# Patient Record
Sex: FEMALE | Race: WHITE | Employment: FULL TIME | URBAN - METROPOLITAN AREA
[De-identification: names, ages, dates, MRNs, and addresses within clinical notes are randomized per-mention and may not be internally consistent; named-entity substitution may affect disease eponyms.]

---

## 2017-10-23 ENCOUNTER — HOSPITAL ENCOUNTER (EMERGENCY)
Facility: CLINIC | Age: 32
Discharge: HOME | End: 2017-10-23
Attending: EMERGENCY MEDICINE | Admitting: EMERGENCY MEDICINE

## 2017-10-23 VITALS
OXYGEN SATURATION: 96 % | HEIGHT: 63 IN | WEIGHT: 246 LBS | TEMPERATURE: 97.7 F | BODY MASS INDEX: 43.59 KG/M2 | RESPIRATION RATE: 15 BRPM | DIASTOLIC BLOOD PRESSURE: 84 MMHG | SYSTOLIC BLOOD PRESSURE: 133 MMHG | HEART RATE: 81 BPM

## 2017-10-23 DIAGNOSIS — H57.12 LEFT EYE PAIN: ICD-10-CM

## 2017-10-23 PROCEDURE — 99283 EMERGENCY DEPT VISIT LOW MDM: CPT

## 2017-10-23 RX ORDER — TETRACAINE HYDROCHLORIDE 5 MG/ML
SOLUTION OPHTHALMIC
Status: DISCONTINUED
Start: 2017-10-23 | End: 2017-10-23 | Stop reason: HOSPADM

## 2017-10-23 ASSESSMENT — VISUAL ACUITY
OD: 20/50
OS: 20/100

## 2017-10-23 ASSESSMENT — ENCOUNTER SYMPTOMS
PHOTOPHOBIA: 1
EYE PAIN: 1
EYE REDNESS: 1

## 2017-10-23 NOTE — DISCHARGE INSTRUCTIONS
You were evaluated for eye pain. We did not find a specific cause. You may have return of iritis but you must be seen by an eye clinic to confirm this.    Go directly to [facility] [place] office.  will be waiting to meet you.        Iritis    The iris is the colored part of the eye that controls the size of the pupil. Iritis is an inflammation of the iris. It can be caused by injury to the eye or disease elsewhere in the body. Diseases linked to iritis include Lyme disease, inflammatory bowel disease, and rheumatoid arthritis. Often, no cause is found.  Iritis usually develops suddenly in one eye. Symptoms include redness, pain in the eye or brow area, sensitivity to light, and blurred vision.  Iritis is treated with eye drops to dilate the pupil and reduce pain. This will cause your vision to be blurred from a few hours up to a week, depending on the medicine used. Steroid drops are typically prescribed to reduce pain from inflammation. Iritis from eye injury usually resolves in 1 to 2 weeks. Iritis from other causes may take several weeks to months to resolve.  Home care    Use eye drops as prescribed.    Wear sunglasses to decrease light sensitivity and discomfort.    If your eye is dilated or if you have been given an eye patch, your driving ability will be affected. Don't drive until the blurred vision wears off and you no longer need an eye patch.    You may use acetaminophen or ibuprofen to control pain, unless another medicine was prescribed. (Note: If you have chronic liver or kidney disease, or if you have ever had a stomach ulcer or gastrointestinal bleeding, talk with your doctor before using these medicines.)  Follow-up care  Follow up with your healthcare provider, or as advised.  When to seek medical advice  Contact your healthcare provider right away if any of these occur:    Symptoms don t start to improve after 1 week    Iritis from eye injury causes symptoms for more  than 2 weeks    Pain increases    Your eye becomes red    You lose some or all of your vision  Date Last Reviewed: 7/1/2017 2000-2017 [employer]. [address], [place] [address]. All rights reserved. This information is not intended as a substitute for professional medical care. Always follow your healthcare professional's instructions.

## 2017-10-23 NOTE — ED AVS SNAPSHOT
[facility] Emergency Department    [address]    [facility] [address]    Phone:  [phone]    Fax:  [phone]                                       Derrick Mix   MRN: [id number]    Department:  [facility] Emergency Department   Date of Visit:  10/23/2017           After Visit Summary Signature Page     I have received my discharge instructions, and my questions have been answered. I have discussed any challenges I see with this plan with the nurse or doctor.    ..........................................................................................................................................  Patient/Patient Representative Signature      ..........................................................................................................................................  Patient Representative Print Name and Relationship to Patient    ..................................................               ................................................  Date                                            Time    ..........................................................................................................................................  Reviewed by Signature/Title    ...................................................              ..............................................  Date                                                            Time

## 2017-10-23 NOTE — LETTER
To Whom it may concern:      Derrick Mix was seen in our Emergency Department today, 10/23/17.  I expect her condition to improve over the next 1-2 days.  She may return to work/school when improved.    Sincerely,        Timur Rhodes RN

## 2017-10-23 NOTE — ED AVS SNAPSHOT
[facility] Emergency Department    [address] [place] [address]    Phone:  [phone]    Fax:  [phone]                                       Derrick Mix   MRN: [id number]    Department:  [facility] Emergency Department   Date of Visit:  10/23/2017           Patient Information     Date Of Birth          1985        Your diagnoses for this visit were:     Left eye pain        You were seen by Leslie Samuels MD.        Discharge Instructions       You were evaluated for eye pain. We did not find a specific cause. You may have return of iritis but you must be seen by an eye clinic to confirm this.    Go directly to [facility] [place] office.  will be waiting to meet you.        Iritis    The iris is the colored part of the eye that controls the size of the pupil. Iritis is an inflammation of the iris. It can be caused by injury to the eye or disease elsewhere in the body. Diseases linked to iritis include Lyme disease, inflammatory bowel disease, and rheumatoid arthritis. Often, no cause is found.  Iritis usually develops suddenly in one eye. Symptoms include redness, pain in the eye or brow area, sensitivity to light, and blurred vision.  Iritis is treated with eye drops to dilate the pupil and reduce pain. This will cause your vision to be blurred from a few hours up to a week, depending on the medicine used. Steroid drops are typically prescribed to reduce pain from inflammation. Iritis from eye injury usually resolves in 1 to 2 weeks. Iritis from other causes may take several weeks to months to resolve.  Home care    Use eye drops as prescribed.    Wear sunglasses to decrease light sensitivity and discomfort.    If your eye is dilated or if you have been given an eye patch, your driving ability will be affected. Don't drive until the blurred vision wears off and you no longer need an eye patch.    You may use  acetaminophen or ibuprofen to control pain, unless another medicine was prescribed. (Note: If you have chronic liver or kidney disease, or if you have ever had a stomach ulcer or gastrointestinal bleeding, talk with your doctor before using these medicines.)  Follow-up care  Follow up with your healthcare provider, or as advised.  When to seek medical advice  Contact your healthcare provider right away if any of these occur:    Symptoms don t start to improve after 1 week    Iritis from eye injury causes symptoms for more than 2 weeks    Pain increases    Your eye becomes red    You lose some or all of your vision  Date Last Reviewed: 7/1/2017 2000-2017 [employer]. [address] [place] [address]. All rights reserved. This information is not intended as a substitute for professional medical care. Always follow your healthcare professional's instructions.          24 Hour Appointment Hotline       To make an appointment at any [facility], call [phone] ([phone]). If you don't have a family doctor or clinic, we will help you find one. [facility] are conveniently located to serve the needs of you and your family.             Review of your medicines      Notice     You have not been prescribed any medications.            Orders Needing Specimen Collection     None      Pending Results     No orders found from 10/21/2017 to 10/24/2017.            Pending Culture Results     No orders found from 10/21/2017 to 10/24/2017.            Pending Results Instructions     If you had any lab results that were not finalized at the time of your Discharge, you can call the ED Lab Result RN at [phone]. You will be contacted by this team for any positive Lab results or changes in treatment. The nurses are available 7 days a week from 10A to 6:30P.  You can leave a message 24 hours per day and they will return your call.        Test Results From Your Hospital Stay                Clinical Quality Measure: Blood Pressure Screening     Your blood pressure was checked while you were in the emergency department today. The last reading we obtained was  BP: 133/84 . Please read the guidelines below about what these numbers mean and what you should do about them.  If your systolic blood pressure (the top number) is less than 120 and your diastolic blood pressure (the bottom number) is less than 80, then your blood pressure is normal. There is nothing more that you need to do about it.  If your systolic blood pressure (the top number) is 120-139 or your diastolic blood pressure (the bottom number) is 80-89, your blood pressure may be higher than it should be. You should have your blood pressure rechecked within a year by a primary care provider.  If your systolic blood pressure (the top number) is 140 or greater or your diastolic blood pressure (the bottom number) is 90 or greater, you may have high blood pressure. High blood pressure is treatable, but if left untreated over time it can put you at risk for heart attack, stroke, or kidney failure. You should have your blood pressure rechecked by a primary care provider within the next 4 weeks.  If your provider in the emergency department today gave you specific instructions to follow-up with your doctor or provider even sooner than that, you should follow that instruction and not wait for up to 4 weeks for your follow-up visit.        Thank you for choosing [facility]       Thank you for choosing [facility] for your care. Our goal is always to provide you with excellent care. Hearing back from our patients is one way we can continue to improve our services. Please take a few minutes to complete the written survey that you may receive in the mail after you visit with us. Thank you!        [facility] Information     1-800-DOCTORS lets you send messages to your doctor, view your test results, renew your prescriptions, schedule appointments and more. To sign up,  "go to [email or web address] . Click on \"Log in\" on the left side of the screen, which will take you to the Welcome page. Then click on \"Sign up Now\" on the right side of the page.     You will be asked to enter the access code listed below, as well as some personal information. Please follow the directions to create your username and password.     Your access code is: [email or web address]  Expires: 2018  5:28 PM     Your access code will  in 90 days. If you need help or a new code, please call your [facility] clinic or [phone].        Care EveryWhere ID     This is your Care EveryWhere ID. This could be used by other organizations to access your [facility] medical records  [id number]        Equal Access to Services     DEVIN WHITLEY : Nathaniel Amaya, sue styles, gabrielle nagel, kimmy ferguson. So [facility] [phone].    ATENCIÓN: Si habla español, tiene a todd disposición servicios gratuitos de asistencia lingüística. Edna al [phone].    We comply with applicable federal civil rights laws and [place] laws. We do not discriminate on the basis of race, color, national origin, age, disability, sex, sexual orientation, or gender identity.            After Visit Summary       This is your record. Keep this with you and show to your community pharmacist(s) and doctor(s) at your next visit.                  "

## 2017-10-23 NOTE — LETTER
To Whom it may concern:      Derrick Mix was seen in our Emergency Department today, 10/23/17.  I expect her condition to improve over the next 1-2 days.  She may return to work/school when improved.    Sincerely,        Fide Olmedo RN
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
## 2017-10-23 NOTE — ED PROVIDER NOTES
"  History     Chief Complaint:  Eye pain    HPI   Derrick Mxi is a 32 year old female who presents to the ED for evaluation of left eye pain. The patient noticed a haze over her left eye yesterday. Yesterday evening, she had some photophobia in the left eye. This morning the patient began to experience pain in her left eye, which is what brought her to the ED today.  Her eye hurts when she tries to open it or tough it. The patient doesn't wear glasses or contacts, even though she is supposed to for far sightedness. In spite of the pain, the patient can still see and read out of her left eye. The patient had a previous eye injury, for which she sees Dr. Erickson at [facility]. The doctor has recommended surgery, which the patient has so far refused. The patient has normal vision in her right eye.     Allergies:  Latex  Paxil [Paroxetine Mesylate]  Zoloft       Medications:    The patient is currently on no regular medications.       Past Medical History:    Bipolar  Asthma      Past Surgical History:    Hernia    Family History:    No past pertinent family history.    Social History:  Smoker: Yes  Negative for alcohol use.  Presented alone.  Marital Status:  Single [1]    Review of Systems   Eyes: Positive for photophobia, pain, redness and visual disturbance.   All other systems reviewed and are negative.        Physical Exam   First Vitals:  BP: 130/75  Pulse: 81  Heart Rate: 81  Temp: 97.7  F (36.5  C)  Resp: 16  Height: 160 cm (5' 3\")  Weight: 111.6 kg (246 lb)  SpO2: 97 %  Visual Acuity-Left: 20/100 (Pt wore sunglasses during exam.)  Visual Acuity-Right: 20/50 (Pt wore sunglasses during exam.)      Physical Exam  Constitutional:  Well developed, Well nourished, comfortable appearing   HENT: Normocephalic,Mucous membranes moist  Respiratory:  No tachypnea or respiratory distress  Musculoskeletal:  No gross deformities, grossly unremarkable range of motion   Integument:  Warm, Dry   Neurologic: " " Alert, attentive and appropriately oriented  Psychiatric:  Very anxious    Procedure Note:   Slit Lamp Examination  Performed by:  Dr. Leslie Samuels  Indication:  Eye pain/discomfort    Tetracaine drops were used to anesthetize the affected eye.  Fluorescein staining was performed.  The eye was inspected under white and blue light, at low and high magnification    Findings:  Eyelids:  Normal externally, the patient could not tolerate eyelid eversion  Cornea:  Normal  Iris:   Normal  Conjunctiva:  Injected  Anterior Chamber: Normal  Pupils:   Normal    Impression:     Exam is notable for photophobia and conjunctival injection, but no corneal abnormality is noted.  Pressure was normal at 15-17          Emergency Department Course         Interventions:  Medications   fluorescein ophthalmic strip 1 strip (not administered)   tetracaine (PONTOCAINE) 0.5 % ophthalmic solution (not administered)     The patient's symptoms were improved with tetracaine    Emergency Department Course:  1718: I spoke with ophthalmology Dr. Beltran from [facility].  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  She had some \"vitreal heme\" that was persistent and therefore was referred to a retinal specialist but never followed up.  I discussed the case at length including visual acuity, exam including fluorescein pressure testing.  Dr. Gaffney that is able to see this patient directly and the patient prefers and is able to go directly to clinic from here.      Impression & Plan      Medical Decision Making:  Derrick Mix is a 32 year old female who presents with eye discomfort. The exam is significant for significant photophobia and conjunctival injection, but no abnormality on fluorescein exam. This is consistent with corneal abrasion. No foreign bodies in eyes or limited lid exam.  No corneal ulcers. No signs of retinal abnormalities, open globe, " acute glaucoma, or other serious eye disease.  No definite signs of anterior chamber involvement such as endopthalmitis at this point.  Given her significant discomfort and history, I did discuss case with ophthalmology, and they are able to see her immediately.  She'll go there directly.     Diagnosis:    ICD-10-CM    1. Left eye pain H57.12        Disposition:  discharged to ophthalmology    Discharge Medications:  There are no discharge medications for this patient.  I, Jordan Meléndez, am serving as a scribe on 10/23/2017 at 4:45 PM to personally document services performed by Leslie Samuels MD based on my observations and the provider's statements to me.              Leslie Samuels MD  10/23/17 1918

## 2018-03-14 ENCOUNTER — HOSPITAL ENCOUNTER (EMERGENCY)
Facility: CLINIC | Age: 33
Discharge: HOME | End: 2018-03-14
Attending: EMERGENCY MEDICINE | Admitting: EMERGENCY MEDICINE
Payer: COMMERCIAL

## 2018-03-14 ENCOUNTER — APPOINTMENT (OUTPATIENT)
Dept: GENERAL RADIOLOGY | Facility: CLINIC | Age: 33
End: 2018-03-14
Attending: PHYSICIAN ASSISTANT
Payer: COMMERCIAL

## 2018-03-14 VITALS
BODY MASS INDEX: 42.18 KG/M2 | OXYGEN SATURATION: 98 % | SYSTOLIC BLOOD PRESSURE: 117 MMHG | DIASTOLIC BLOOD PRESSURE: 76 MMHG | WEIGHT: 238.1 LBS | TEMPERATURE: 97.4 F | RESPIRATION RATE: 18 BRPM

## 2018-03-14 DIAGNOSIS — S30.0XXA CONTUSION OF LOWER BACK, INITIAL ENCOUNTER: ICD-10-CM

## 2018-03-14 DIAGNOSIS — M25.511 ACUTE PAIN OF RIGHT SHOULDER: ICD-10-CM

## 2018-03-14 PROCEDURE — 73030 X-RAY EXAM OF SHOULDER: CPT | Mod: RT

## 2018-03-14 PROCEDURE — 99284 EMERGENCY DEPT VISIT MOD MDM: CPT | Mod: 25

## 2018-03-14 PROCEDURE — 25000132 ZZH RX MED GY IP 250 OP 250 PS 637: Performed by: PHYSICIAN ASSISTANT

## 2018-03-14 PROCEDURE — 72100 X-RAY EXAM L-S SPINE 2/3 VWS: CPT

## 2018-03-14 RX ORDER — NAPROXEN 500 MG/1
500 TABLET ORAL 2 TIMES DAILY WITH MEALS
Qty: 16 TABLET | Refills: 0 | Status: SHIPPED | OUTPATIENT
Start: 2018-03-14 | End: 2018-03-22

## 2018-03-14 RX ORDER — HYDROCODONE BITARTRATE AND ACETAMINOPHEN 5; 325 MG/1; MG/1
2 TABLET ORAL ONCE
Status: COMPLETED | OUTPATIENT
Start: 2018-03-14 | End: 2018-03-14

## 2018-03-14 RX ORDER — CYCLOBENZAPRINE HCL 10 MG
10 TABLET ORAL 3 TIMES DAILY PRN
Qty: 20 TABLET | Refills: 0 | Status: SHIPPED | OUTPATIENT
Start: 2018-03-14 | End: 2018-03-17

## 2018-03-14 RX ADMIN — HYDROCODONE BITARTRATE AND ACETAMINOPHEN 2 TABLET: 5; 325 TABLET ORAL at 07:41

## 2018-03-14 ASSESSMENT — ENCOUNTER SYMPTOMS
SINUS PAIN: 0
FEVER: 0
FATIGUE: 0
EYE PAIN: 0
FLANK PAIN: 0
ABDOMINAL PAIN: 0
NECK STIFFNESS: 0
APPETITE CHANGE: 0
ARTHRALGIAS: 1
DYSURIA: 0
CONSTIPATION: 0
WEAKNESS: 0
JOINT SWELLING: 0
CHILLS: 0
MYALGIAS: 1
DIARRHEA: 0
SHORTNESS OF BREATH: 0
CHEST TIGHTNESS: 0
NECK PAIN: 0
DIZZINESS: 0
NAUSEA: 0
HEMATURIA: 0
COUGH: 0
NUMBNESS: 0
HEADACHES: 0
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
## 2018-03-14 NOTE — ED AVS SNAPSHOT
[facility] Emergency Department    [address]    [facility] [address]    Phone:  [phone]    Fax:  [phone]                                       Derrick Mix   MRN: [id number]    Department:  [facility] Emergency Department   Date of Visit:  3/14/2018           After Visit Summary Signature Page     I have received my discharge instructions, and my questions have been answered. I have discussed any challenges I see with this plan with the nurse or doctor.    ..........................................................................................................................................  Patient/Patient Representative Signature      ..........................................................................................................................................  Patient Representative Print Name and Relationship to Patient    ..................................................               ................................................  Date                                            Time    ..........................................................................................................................................  Reviewed by Signature/Title    ...................................................              ..............................................  Date                                                            Time
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
## 2018-03-14 NOTE — DISCHARGE INSTRUCTIONS
"  SCIATICA    Sciatica (\"Lumbar Radiculopathy\") causes a pain that spreads from the lower back down into the buttock, hip and leg. Sometimes leg pain can occur without any back pain. Sciatica is due to irritation or pressure on a spinal nerve as it comes out of the spinal canal. This is most often due to a bulge or rupture of a nearby spinal disk (the cartilage cushion between each spinal bone), which presses on a nearby nerve. Other causes include spinal stenosis (narrowing of the spinal canal) and spasm of the piriformis muscle (a muscle in the buttocks that the sciatic nerve passes through).  Sciatica may begin after a sudden twisting/bending force (such as in a car accident), or sometimes after a simple awkward movement. In either case, muscle spasm is commonly present and contributes to the pain.  The diagnosis of sciatica is made from the symptoms and physical exam. Unless you had a physical injury (such as a car accident or fall), X-rays are usually not ordered for the initial evaluation of sciatica because the nerves and disks cannot be seen on an X-ray. Most sciatica (80-90%) gets better with time.  What can I do about my low back pain?   There are three main things you can do to ease low back pain and help it go away.    Use heat or cold packs.     Take medicine as directed.     Use positions, movements and exercises. Stay active! Too much rest can make your symptoms worse.   Using heat or cold packs  Try cold packs or gentle heat to ease your pain. Use whichever gives the most relief. Apply the cold pack or heat for 15 minutes at a time, as often as needed.  Taking medicine  If taking over-the-counter medicine:    Take ibuprofen (Advil, Motrin) 600 mg. three times a day as needed for pain.   OR    Take Aleve (naproxen sodium) 220 to 440 mg. two times a day as needed for pain   If your doctor prescribed a muscle relaxant (cyclobenzapine 10 mg.):    Take one half ( ) to 1 tablet at bedtime     Do not drive " when taking this medicine. This drug may make you sleepy.   Using positions, movements and exercises  Research tells us that moving your joints and muscles can help you recover from back pain. Such activity should be simple and gentle.  Use the positions below as well as walking to help relieve your discomfort. Try taking a short walk every 3 to 4 hours during the day. Walk for a few minutes inside your home or take longer walks outside, on a treadmill or at a mall. Slowly increase the amount of time you walk. Expect discomfort when you begin, but it should lessen as your back starts to recover.  Finding a position that is comfortable  When your back pain is new, you may find that certain positions will ease your pain. Gently try each of the following positions until you find one that eases your pain. Once you find a position of comfort, use it as often as you like while you recover. Return to your daily routine as soon as possible.     Lie on your back with your legs bent. You can do this by placing a pillow under your knees or lie on the floor and rest your lower legs on the seat of a chair.     Lie on your side with your knees bent and place a pillow between your knees.     Lie on your stomach over pillows.   When should I call my doctor?  Your back pain should improve over the first couple of weeks. As it improves, you should be able to return to your normal activities. But call your doctor if:    You have a sudden change in your ability to control?     your bladder or bowels.     You begin to feel tingling in your groin or legs.     The pain spreads down your leg and into your foot.     Your toes, feet or leg muscles begin to feel weak.     You feel generally unwell or sick.     Your pain gets worse.     9696-1801 [facility], [address] [place] [address], [place] [address]. All rights reserved. This information is not intended as a substitute for professional medical care. Always follow your healthcare  professional's instructions.  Shoulder Pain   Shoulder pain can have many causes. Pain often comes from the structures that surround the shoulder joint. These are the joint capsule, ligaments, tendons, muscles, and bursa. Pain can also come from cartilage in the joint. Cartilage can become worn out or injured. It s important to know what s causing your pain so the healthcare provider can use the correct treatment. But sometimes it s difficult to find the exact cause of shoulder pain. You may need to see a specialist (orthopedist). You may also need special tests such as a CT scan or MRI. The provider may need to use special tools to look inside the joint (arthroscopy).  Shoulder pain can be treated with a sling or a device that keeps your shoulder from moving. You can take an anti-inflammatory medicine such as ibuprofen to ease pain. You may need to do special shoulder exercises. Follow up with a specialist if the pain is severe or doesn t go away after a few weeks.  Home care  Follow these tips when caring for yourself at home:    If a sling was given to you, leave it in place for the time advised by your healthcare provider. If you aren t sure how long to wear it, ask for advice. If the sling becomes loose, adjust it so that your forearm is level with the ground. Your shoulder should feel well supported.    Put an ice pack on the injured area for 20 minutes every 1 to 2 hours the first day. You can make your own ice pack by putting ice cubes in a plastic bag. Wrap the bag in a thin towel. Continue with ice packs 3 to 4 times a day for the next 2 days. Then use the pack as needed to ease pain and swelling.    You may use acetaminophen or ibuprofen to control pain, unless another pain medicine was prescribed. If you have chronic liver or kidney disease, talk with your healthcare provider before using these medicines. Also talk with your provider if you ve ever had a stomach ulcer or GI bleeding.    Shoulder pain may  seem worse at night, when there is less to distract you from the pain. If you sleep on your side, try to keep weight off your painful shoulder. Propping pillows behind you may stop you from rolling over onto that shoulder during sleep.     Shoulder and elbow joints can become stiff if left in a sling for too long. You should start range of motion exercises about 7 to 10 days after the injury. Talk with your provider to find out what type of exercises to do and how soon to start.    You can take the sling off to shower or bathe.  Follow-up care  Follow up with your healthcare provider if you don t start to get better in the next 5 days.  When to seek medical advice  Call your healthcare provider right away if any of these occur:    Pain or swelling gets worse or continues for more than a few days    Your hand or fingers become cold, blue, numb, or tingly    Large amount of bruising on your shoulder or upper arm    Difficulty moving your hand or fingers    Weakness in your hand or fingers    Your shoulder becomes stiff    It feels like your shoulder is popping out    You are less able to do your daily activities  Date Last Reviewed: 10/1/2016    [id number] [employer] [address] [place] [address], [place] [address]. All rights reserved. This information is not intended as a substitute for professional medical care. Always follow your healthcare professional's instructions.
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
## 2020-01-11 ENCOUNTER — HOSPITAL ENCOUNTER (INPATIENT)
Facility: CLINIC | Age: 35
LOS: 4 days | Discharge: HOME | End: 2020-01-16
Attending: PHYSICIAN ASSISTANT | Admitting: INTERNAL MEDICINE
Payer: COMMERCIAL

## 2020-01-11 DIAGNOSIS — K65.1 INTRA-ABDOMINAL ABSCESS (H): ICD-10-CM

## 2020-01-11 PROCEDURE — 99285 EMERGENCY DEPT VISIT HI MDM: CPT | Mod: 25

## 2020-01-11 PROCEDURE — 84702 CHORIONIC GONADOTROPIN TEST: CPT

## 2020-01-11 PROCEDURE — 85025 COMPLETE CBC W/AUTO DIFF WBC: CPT | Performed by: PHYSICIAN ASSISTANT

## 2020-01-11 PROCEDURE — 80048 BASIC METABOLIC PNL TOTAL CA: CPT | Performed by: PHYSICIAN ASSISTANT

## 2020-01-11 RX ORDER — HYDROMORPHONE HYDROCHLORIDE 1 MG/ML
0.5 INJECTION, SOLUTION INTRAMUSCULAR; INTRAVENOUS; SUBCUTANEOUS
Status: DISCONTINUED | OUTPATIENT
Start: 2020-01-11 | End: 2020-01-12

## 2020-01-11 RX ORDER — ONDANSETRON 2 MG/ML
4 INJECTION INTRAMUSCULAR; INTRAVENOUS ONCE
Status: COMPLETED | OUTPATIENT
Start: 2020-01-11 | End: 2020-01-12

## 2020-01-11 NOTE — LETTER
[place] [address] [facility]  [address] [facility] [address]  [phone]    2020    Re: Derrick Mix  [address]  [facility] [address]  [phone] (home) none (work)    : 1985      To Whom It May Concern:      Derrick Mix was hospitalized from 2020 until 2020 due to medical illness.  She may return to work 2020 with full duty.        Sincerely,        Ekaterina Rodriguez MD

## 2020-01-12 ENCOUNTER — APPOINTMENT (OUTPATIENT)
Dept: ULTRASOUND IMAGING | Facility: CLINIC | Age: 35
End: 2020-01-12
Attending: PHYSICIAN ASSISTANT
Payer: COMMERCIAL

## 2020-01-12 ENCOUNTER — APPOINTMENT (OUTPATIENT)
Dept: CT IMAGING | Facility: CLINIC | Age: 35
End: 2020-01-12
Attending: PHYSICIAN ASSISTANT
Payer: COMMERCIAL

## 2020-01-12 PROBLEM — K57.32 DIVERTICULITIS LARGE INTESTINE: Status: ACTIVE | Noted: 2020-01-12

## 2020-01-12 LAB
ALBUMIN UR-MCNC: NEGATIVE MG/DL
ANION GAP SERPL CALCULATED.3IONS-SCNC: 2 MMOL/L (ref 3–14)
ANION GAP SERPL CALCULATED.3IONS-SCNC: 5 MMOL/L (ref 3–14)
APPEARANCE UR: CLEAR
B-HCG FREE SERPL-ACNC: <5 IU/L
BASOPHILS # BLD AUTO: 0 10E9/L (ref 0–0.2)
BASOPHILS NFR BLD AUTO: 0.3 %
BILIRUB UR QL STRIP: NEGATIVE
BUN SERPL-MCNC: 7 MG/DL (ref 7–30)
BUN SERPL-MCNC: 8 MG/DL (ref 7–30)
C TRACH DNA SPEC QL NAA+PROBE: NEGATIVE
CALCIUM SERPL-MCNC: 8.2 MG/DL (ref 8.5–10.1)
CALCIUM SERPL-MCNC: 8.8 MG/DL (ref 8.5–10.1)
CHLORIDE SERPL-SCNC: 105 MMOL/L (ref 94–109)
CHLORIDE SERPL-SCNC: 110 MMOL/L (ref 94–109)
CO2 SERPL-SCNC: 26 MMOL/L (ref 20–32)
CO2 SERPL-SCNC: 27 MMOL/L (ref 20–32)
COLOR UR AUTO: ABNORMAL
CREAT SERPL-MCNC: 0.77 MG/DL (ref 0.52–1.04)
CREAT SERPL-MCNC: 0.81 MG/DL (ref 0.52–1.04)
DIFFERENTIAL METHOD BLD: ABNORMAL
EOSINOPHIL # BLD AUTO: 0.2 10E9/L (ref 0–0.7)
EOSINOPHIL NFR BLD AUTO: 1 %
ERYTHROCYTE [DISTWIDTH] IN BLOOD BY AUTOMATED COUNT: 12.7 % (ref 10–15)
ERYTHROCYTE [DISTWIDTH] IN BLOOD BY AUTOMATED COUNT: 12.8 % (ref 10–15)
GFR SERPL CREATININE-BSD FRML MDRD: >90 ML/MIN/{1.73_M2}
GFR SERPL CREATININE-BSD FRML MDRD: >90 ML/MIN/{1.73_M2}
GLUCOSE SERPL-MCNC: 101 MG/DL (ref 70–99)
GLUCOSE SERPL-MCNC: 111 MG/DL (ref 70–99)
GLUCOSE UR STRIP-MCNC: NEGATIVE MG/DL
HCT VFR BLD AUTO: 38.2 % (ref 35–47)
HCT VFR BLD AUTO: 42.1 % (ref 35–47)
HGB BLD-MCNC: 12.4 G/DL (ref 11.7–15.7)
HGB BLD-MCNC: 13.6 G/DL (ref 11.7–15.7)
HGB UR QL STRIP: NEGATIVE
IMM GRANULOCYTES # BLD: 0.1 10E9/L (ref 0–0.4)
IMM GRANULOCYTES NFR BLD: 0.5 %
KETONES UR STRIP-MCNC: NEGATIVE MG/DL
LACTATE BLD-SCNC: 0.5 MMOL/L (ref 0.7–2)
LEUKOCYTE ESTERASE UR QL STRIP: ABNORMAL
LYMPHOCYTES # BLD AUTO: 2.8 10E9/L (ref 0.8–5.3)
LYMPHOCYTES NFR BLD AUTO: 19 %
MCH RBC QN AUTO: 29.8 PG (ref 26.5–33)
MCH RBC QN AUTO: 29.9 PG (ref 26.5–33)
MCHC RBC AUTO-ENTMCNC: 32.3 G/DL (ref 31.5–36.5)
MCHC RBC AUTO-ENTMCNC: 32.5 G/DL (ref 31.5–36.5)
MCV RBC AUTO: 92 FL (ref 78–100)
MCV RBC AUTO: 92 FL (ref 78–100)
MONOCYTES # BLD AUTO: 0.7 10E9/L (ref 0–1.3)
MONOCYTES NFR BLD AUTO: 5 %
N GONORRHOEA DNA SPEC QL NAA+PROBE: NEGATIVE
NEUTROPHILS # BLD AUTO: 10.9 10E9/L (ref 1.6–8.3)
NEUTROPHILS NFR BLD AUTO: 74.2 %
NITRATE UR QL: NEGATIVE
NRBC # BLD AUTO: 0 10*3/UL
NRBC BLD AUTO-RTO: 0 /100
PH UR STRIP: 7 PH (ref 5–7)
PLATELET # BLD AUTO: 220 10E9/L (ref 150–450)
PLATELET # BLD AUTO: 234 10E9/L (ref 150–450)
POTASSIUM SERPL-SCNC: 3.7 MMOL/L (ref 3.4–5.3)
POTASSIUM SERPL-SCNC: 3.8 MMOL/L (ref 3.4–5.3)
RBC # BLD AUTO: 4.15 10E12/L (ref 3.8–5.2)
RBC # BLD AUTO: 4.56 10E12/L (ref 3.8–5.2)
RBC #/AREA URNS AUTO: 4 /HPF (ref 0–2)
SODIUM SERPL-SCNC: 136 MMOL/L (ref 133–144)
SODIUM SERPL-SCNC: 139 MMOL/L (ref 133–144)
SOURCE: ABNORMAL
SP GR UR STRIP: 1 (ref 1–1.03)
SPECIMEN SOURCE: ABNORMAL
SPECIMEN SOURCE: NORMAL
SPECIMEN SOURCE: NORMAL
SQUAMOUS #/AREA URNS AUTO: 1 /HPF (ref 0–1)
UROBILINOGEN UR STRIP-MCNC: NORMAL MG/DL (ref 0–2)
WBC # BLD AUTO: 13.2 10E9/L (ref 4–11)
WBC # BLD AUTO: 14.7 10E9/L (ref 4–11)
WBC #/AREA URNS AUTO: 14 /HPF (ref 0–5)
WET PREP SPEC: ABNORMAL

## 2020-01-12 PROCEDURE — 36415 COLL VENOUS BLD VENIPUNCTURE: CPT | Performed by: INTERNAL MEDICINE

## 2020-01-12 PROCEDURE — 25800030 ZZH RX IP 258 OP 636: Performed by: INTERNAL MEDICINE

## 2020-01-12 PROCEDURE — 25000128 H RX IP 250 OP 636

## 2020-01-12 PROCEDURE — 25000132 ZZH RX MED GY IP 250 OP 250 PS 637: Performed by: INTERNAL MEDICINE

## 2020-01-12 PROCEDURE — 96365 THER/PROPH/DIAG IV INF INIT: CPT | Mod: 59

## 2020-01-12 PROCEDURE — 80048 BASIC METABOLIC PNL TOTAL CA: CPT | Performed by: INTERNAL MEDICINE

## 2020-01-12 PROCEDURE — 83605 ASSAY OF LACTIC ACID: CPT | Performed by: INTERNAL MEDICINE

## 2020-01-12 PROCEDURE — 25000132 ZZH RX MED GY IP 250 OP 250 PS 637: Performed by: OBSTETRICS & GYNECOLOGY

## 2020-01-12 PROCEDURE — 25000128 H RX IP 250 OP 636: Performed by: INTERNAL MEDICINE

## 2020-01-12 PROCEDURE — 25000128 H RX IP 250 OP 636: Performed by: PHYSICIAN ASSISTANT

## 2020-01-12 PROCEDURE — 12000000 ZZH R&B MED SURG/OB

## 2020-01-12 PROCEDURE — 96375 TX/PRO/DX INJ NEW DRUG ADDON: CPT

## 2020-01-12 PROCEDURE — 85027 COMPLETE CBC AUTOMATED: CPT | Performed by: INTERNAL MEDICINE

## 2020-01-12 PROCEDURE — 25800030 ZZH RX IP 258 OP 636: Performed by: PHYSICIAN ASSISTANT

## 2020-01-12 PROCEDURE — 87210 SMEAR WET MOUNT SALINE/INK: CPT | Performed by: PHYSICIAN ASSISTANT

## 2020-01-12 PROCEDURE — 25000125 ZZHC RX 250: Performed by: PHYSICIAN ASSISTANT

## 2020-01-12 PROCEDURE — 74177 CT ABD & PELVIS W/CONTRAST: CPT

## 2020-01-12 PROCEDURE — 25000128 H RX IP 250 OP 636: Performed by: HOSPITALIST

## 2020-01-12 PROCEDURE — 81001 URINALYSIS AUTO W/SCOPE: CPT | Performed by: PHYSICIAN ASSISTANT

## 2020-01-12 PROCEDURE — 99223 1ST HOSP IP/OBS HIGH 75: CPT | Mod: AI | Performed by: INTERNAL MEDICINE

## 2020-01-12 PROCEDURE — 96376 TX/PRO/DX INJ SAME DRUG ADON: CPT

## 2020-01-12 PROCEDURE — 76856 US EXAM PELVIC COMPLETE: CPT

## 2020-01-12 PROCEDURE — 87491 CHLMYD TRACH DNA AMP PROBE: CPT | Performed by: PHYSICIAN ASSISTANT

## 2020-01-12 PROCEDURE — 96361 HYDRATE IV INFUSION ADD-ON: CPT

## 2020-01-12 PROCEDURE — 87591 N.GONORRHOEAE DNA AMP PROB: CPT | Performed by: PHYSICIAN ASSISTANT

## 2020-01-12 RX ORDER — POTASSIUM CL/LIDO/0.9 % NACL 10MEQ/0.1L
10 INTRAVENOUS SOLUTION, PIGGYBACK (ML) INTRAVENOUS
Status: DISCONTINUED | OUTPATIENT
Start: 2020-01-12 | End: 2020-01-16 | Stop reason: HOSPADM

## 2020-01-12 RX ORDER — POTASSIUM CHLORIDE 29.8 MG/ML
20 INJECTION INTRAVENOUS
Status: DISCONTINUED | OUTPATIENT
Start: 2020-01-12 | End: 2020-01-16 | Stop reason: HOSPADM

## 2020-01-12 RX ORDER — POTASSIUM CHLORIDE 1500 MG/1
20-40 TABLET, EXTENDED RELEASE ORAL
Status: DISCONTINUED | OUTPATIENT
Start: 2020-01-12 | End: 2020-01-16 | Stop reason: HOSPADM

## 2020-01-12 RX ORDER — SODIUM CHLORIDE 9 MG/ML
INJECTION, SOLUTION INTRAVENOUS CONTINUOUS
Status: DISCONTINUED | OUTPATIENT
Start: 2020-01-12 | End: 2020-01-16 | Stop reason: HOSPADM

## 2020-01-12 RX ORDER — POTASSIUM CHLORIDE 1.5 G/1.58G
20-40 POWDER, FOR SOLUTION ORAL
Status: DISCONTINUED | OUTPATIENT
Start: 2020-01-12 | End: 2020-01-16 | Stop reason: HOSPADM

## 2020-01-12 RX ORDER — MAGNESIUM SULFATE HEPTAHYDRATE 40 MG/ML
4 INJECTION, SOLUTION INTRAVENOUS EVERY 4 HOURS PRN
Status: DISCONTINUED | OUTPATIENT
Start: 2020-01-12 | End: 2020-01-16 | Stop reason: HOSPADM

## 2020-01-12 RX ORDER — ONDANSETRON 4 MG/1
4 TABLET, ORALLY DISINTEGRATING ORAL EVERY 6 HOURS PRN
Status: DISCONTINUED | OUTPATIENT
Start: 2020-01-12 | End: 2020-01-16 | Stop reason: HOSPADM

## 2020-01-12 RX ORDER — IOPAMIDOL 755 MG/ML
500 INJECTION, SOLUTION INTRAVASCULAR ONCE
Status: COMPLETED | OUTPATIENT
Start: 2020-01-12 | End: 2020-01-12

## 2020-01-12 RX ORDER — POTASSIUM CHLORIDE 7.45 MG/ML
10 INJECTION INTRAVENOUS
Status: DISCONTINUED | OUTPATIENT
Start: 2020-01-12 | End: 2020-01-16 | Stop reason: HOSPADM

## 2020-01-12 RX ORDER — MORPHINE SULFATE 2 MG/ML
INJECTION, SOLUTION INTRAMUSCULAR; INTRAVENOUS
Status: COMPLETED
Start: 2020-01-12 | End: 2020-01-12

## 2020-01-12 RX ORDER — ONDANSETRON 2 MG/ML
4 INJECTION INTRAMUSCULAR; INTRAVENOUS EVERY 6 HOURS PRN
Status: DISCONTINUED | OUTPATIENT
Start: 2020-01-12 | End: 2020-01-16 | Stop reason: HOSPADM

## 2020-01-12 RX ORDER — DOXYCYCLINE 100 MG/1
100 CAPSULE ORAL EVERY 12 HOURS SCHEDULED
Status: DISCONTINUED | OUTPATIENT
Start: 2020-01-12 | End: 2020-01-16 | Stop reason: HOSPADM

## 2020-01-12 RX ORDER — MORPHINE SULFATE 2 MG/ML
2-4 INJECTION, SOLUTION INTRAMUSCULAR; INTRAVENOUS
Status: DISCONTINUED | OUTPATIENT
Start: 2020-01-12 | End: 2020-01-16 | Stop reason: HOSPADM

## 2020-01-12 RX ORDER — METOCLOPRAMIDE HYDROCHLORIDE 5 MG/ML
10 INJECTION INTRAMUSCULAR; INTRAVENOUS EVERY 6 HOURS PRN
Status: DISCONTINUED | OUTPATIENT
Start: 2020-01-12 | End: 2020-01-16 | Stop reason: HOSPADM

## 2020-01-12 RX ORDER — ACETAMINOPHEN 325 MG/1
650 TABLET ORAL EVERY 4 HOURS PRN
Status: DISCONTINUED | OUTPATIENT
Start: 2020-01-12 | End: 2020-01-16 | Stop reason: HOSPADM

## 2020-01-12 RX ORDER — NALOXONE HYDROCHLORIDE 0.4 MG/ML
.1-.4 INJECTION, SOLUTION INTRAMUSCULAR; INTRAVENOUS; SUBCUTANEOUS
Status: DISCONTINUED | OUTPATIENT
Start: 2020-01-12 | End: 2020-01-16 | Stop reason: HOSPADM

## 2020-01-12 RX ORDER — HYDROMORPHONE HYDROCHLORIDE 1 MG/ML
.3-.5 INJECTION, SOLUTION INTRAMUSCULAR; INTRAVENOUS; SUBCUTANEOUS
Status: DISCONTINUED | OUTPATIENT
Start: 2020-01-12 | End: 2020-01-12

## 2020-01-12 RX ORDER — LIDOCAINE 40 MG/G
CREAM TOPICAL
Status: DISCONTINUED | OUTPATIENT
Start: 2020-01-12 | End: 2020-01-16 | Stop reason: HOSPADM

## 2020-01-12 RX ORDER — METRONIDAZOLE 500 MG/1
2000 TABLET ORAL ONCE
Status: COMPLETED | OUTPATIENT
Start: 2020-01-12 | End: 2020-01-12

## 2020-01-12 RX ADMIN — TAZOBACTAM SODIUM AND PIPERACILLIN SODIUM 3.38 G: 375; 3 INJECTION, SOLUTION INTRAVENOUS at 13:06

## 2020-01-12 RX ADMIN — DOXYCYCLINE HYCLATE 100 MG: 100 CAPSULE ORAL at 20:24

## 2020-01-12 RX ADMIN — HYDROMORPHONE HYDROCHLORIDE 0.5 MG: 1 INJECTION, SOLUTION INTRAMUSCULAR; INTRAVENOUS; SUBCUTANEOUS at 05:28

## 2020-01-12 RX ADMIN — METRONIDAZOLE 2000 MG: 500 TABLET ORAL at 13:56

## 2020-01-12 RX ADMIN — TAZOBACTAM SODIUM AND PIPERACILLIN SODIUM 3.38 G: 375; 3 INJECTION, SOLUTION INTRAVENOUS at 07:26

## 2020-01-12 RX ADMIN — ONDANSETRON HYDROCHLORIDE 4 MG: 2 INJECTION, SOLUTION INTRAMUSCULAR; INTRAVENOUS at 14:14

## 2020-01-12 RX ADMIN — ONDANSETRON HYDROCHLORIDE 4 MG: 2 INJECTION, SOLUTION INTRAMUSCULAR; INTRAVENOUS at 20:31

## 2020-01-12 RX ADMIN — HYDROMORPHONE HYDROCHLORIDE 0.5 MG: 1 INJECTION, SOLUTION INTRAMUSCULAR; INTRAVENOUS; SUBCUTANEOUS at 00:02

## 2020-01-12 RX ADMIN — ACETAMINOPHEN 650 MG: 325 TABLET, FILM COATED ORAL at 12:07

## 2020-01-12 RX ADMIN — ONDANSETRON HYDROCHLORIDE 4 MG: 2 INJECTION, SOLUTION INTRAMUSCULAR; INTRAVENOUS at 00:02

## 2020-01-12 RX ADMIN — ACETAMINOPHEN 650 MG: 325 TABLET, FILM COATED ORAL at 07:24

## 2020-01-12 RX ADMIN — HYDROMORPHONE HYDROCHLORIDE 0.5 MG: 1 INJECTION, SOLUTION INTRAMUSCULAR; INTRAVENOUS; SUBCUTANEOUS at 07:24

## 2020-01-12 RX ADMIN — IOPAMIDOL 100 ML: 755 INJECTION, SOLUTION INTRAVENOUS at 00:18

## 2020-01-12 RX ADMIN — DOXYCYCLINE HYCLATE 100 MG: 100 CAPSULE ORAL at 13:06

## 2020-01-12 RX ADMIN — SODIUM CHLORIDE: 9 INJECTION, SOLUTION INTRAVENOUS at 16:55

## 2020-01-12 RX ADMIN — TAZOBACTAM SODIUM AND PIPERACILLIN SODIUM 3.38 G: 375; 3 INJECTION, SOLUTION INTRAVENOUS at 01:20

## 2020-01-12 RX ADMIN — HYDROMORPHONE HYDROCHLORIDE 0.5 MG: 1 INJECTION, SOLUTION INTRAMUSCULAR; INTRAVENOUS; SUBCUTANEOUS at 01:20

## 2020-01-12 RX ADMIN — MORPHINE SULFATE 2 MG: 2 INJECTION, SOLUTION INTRAMUSCULAR; INTRAVENOUS at 13:08

## 2020-01-12 RX ADMIN — SODIUM CHLORIDE 65 ML: 9 INJECTION, SOLUTION INTRAVENOUS at 00:18

## 2020-01-12 RX ADMIN — SODIUM CHLORIDE: 9 INJECTION, SOLUTION INTRAVENOUS at 05:28

## 2020-01-12 RX ADMIN — ACETAMINOPHEN 650 MG: 325 TABLET, FILM COATED ORAL at 03:30

## 2020-01-12 RX ADMIN — MORPHINE SULFATE 2 MG: 2 INJECTION, SOLUTION INTRAMUSCULAR; INTRAVENOUS at 16:17

## 2020-01-12 RX ADMIN — MORPHINE SULFATE 2 MG: 2 INJECTION, SOLUTION INTRAMUSCULAR; INTRAVENOUS at 20:24

## 2020-01-12 RX ADMIN — MORPHINE SULFATE 2 MG: 2 INJECTION, SOLUTION INTRAMUSCULAR; INTRAVENOUS at 09:09

## 2020-01-12 RX ADMIN — HYDROMORPHONE HYDROCHLORIDE 0.5 MG: 1 INJECTION, SOLUTION INTRAMUSCULAR; INTRAVENOUS; SUBCUTANEOUS at 03:20

## 2020-01-12 RX ADMIN — TAZOBACTAM SODIUM AND PIPERACILLIN SODIUM 3.38 G: 375; 3 INJECTION, SOLUTION INTRAVENOUS at 19:11

## 2020-01-12 RX ADMIN — SODIUM CHLORIDE 1000 ML: 9 INJECTION, SOLUTION INTRAVENOUS at 03:21

## 2020-01-12 RX ADMIN — SODIUM CHLORIDE 1000 ML: 9 INJECTION, SOLUTION INTRAVENOUS at 00:02

## 2020-01-12 RX ADMIN — ACETAMINOPHEN 650 MG: 325 TABLET, FILM COATED ORAL at 16:17

## 2020-01-12 ASSESSMENT — ACTIVITIES OF DAILY LIVING (ADL)
ADLS_ACUITY_SCORE: 11
ADLS_ACUITY_SCORE: 10
ADLS_ACUITY_SCORE: 11
DRESS: 0-->INDEPENDENT
ADLS_ACUITY_SCORE: 12
TRANSFERRING: 0-->INDEPENDENT
TOILETING: 0-->INDEPENDENT
COGNITION: 0 - NO COGNITION ISSUES REPORTED
BATHING: 0-->INDEPENDENT
SWALLOWING: 0-->SWALLOWS FOODS/LIQUIDS WITHOUT DIFFICULTY
AMBULATION: 0-->INDEPENDENT
ADLS_ACUITY_SCORE: 11
RETIRED_COMMUNICATION: 0-->UNDERSTANDS/COMMUNICATES WITHOUT DIFFICULTY
FALL_HISTORY_WITHIN_LAST_SIX_MONTHS: NO
RETIRED_EATING: 0-->INDEPENDENT

## 2020-01-12 ASSESSMENT — ENCOUNTER SYMPTOMS
FLANK PAIN: 0
NAUSEA: 1
VOMITING: 0
FEVER: 0
FREQUENCY: 0
CONSTIPATION: 1
DIARRHEA: 0
DYSURIA: 0
ABDOMINAL PAIN: 1
HEMATURIA: 0

## 2020-01-12 NOTE — PHARMACY-ADMISSION MEDICATION HISTORY
Admission medication history interview status for this patient is complete. See [facility] admission navigator for allergy information, prior to admission medications and immunization status.     Medication history interview source(s):Patient  Medication history resources (including written lists, pill bottles, clinic record): [facility]  Primary pharmacy: No preferred pharmacy. If has discharge meds, would like them filled here at [facility]. Otherwise, interested in trying [facility] in [place].    Changes made to PTA medication list:  Added: none  Deleted: none  Changed: none    Actions taken by pharmacist (provider contacted, etc):None     Additional medication history information:None    Medication reconciliation/reorder completed by provider prior to medication history?  No     Do you take OTC medications (eg tylenol, ibuprofen, fish oil, eye/ear drops, etc)? No     Prior to Admission medications    Not on File

## 2020-01-12 NOTE — PLAN OF CARE
"Up independently in room. Pain to RUQ/RLQ managed with PRN IV dilaudid and tylenol. NPO, IV fluids. BM 1/11, active bowel sounds. Tearful upon arrival and stated, \"I don't want to be here.\" Settled in and slept. Colorectal consult this AM.   "

## 2020-01-12 NOTE — ED PROVIDER NOTES
History     Chief Complaint:  Abdominal Pain      HPI   Derrick Mix is a 34 year old female who presents with abdominal pain. Patient reports yesterday she developed RLQ abdominal pain that has progressively worsened since onset. She reports pain is worse with any movement. She has had decreased appetite with nausea, but no vomiting. No diarrhea. No dysuria or hematuria. Tried some tylenol and ibuprofen without relief. She denies any unusual vaginal discharge. She is sexually active with one partner and has been with only that partner for many years.     Allergies:  Allergies   Allergen Reactions     Latex      Paxil [Paroxetine Mesylate]      Zoloft         Medications:    None    Past Medical History:    Bipolar Disorder  Asthma    Past Surgical History:    Hernia repair    Family History:    History reviewed. No pertinent family history.     Social History:  Admits to marijuana use.  Denies ETOH or drug use.   Marital Status:  Single [1]      Review of Systems   Constitutional: Negative for fever.   Gastrointestinal: Positive for abdominal pain, constipation and nausea. Negative for diarrhea and vomiting.   Genitourinary: Negative for dysuria, flank pain, frequency, hematuria and vaginal discharge.   All other systems reviewed and are negative.         Physical Exam   First Vitals:  BP: 136/83  Heart Rate: 105  Temp: 98.3  F (36.8  C)  Resp: 20  SpO2: 99 %      Physical Exam  Constitutional: appears uncomfortable secondary to abdominal pain, but non-toxic.   Head: No external signs of trauma noted to head or face.   Eyes: Pupils are equal, round, and reactive to light. Conjunctiva normal. No scleral icterus.   ENT: MMM. Normal voice.   Neck: normal ROM.   Cardiovascular: Normal rate, regular rhythm, and intact distal pulses.    Respiratory: Effort normal. No respiratory distress. Lungs clear to auscultation bilaterally.   GI: Soft. Suprapubic and RLQ tenderness to palpation. No rebound or guarding.   :  small amount of white discharge in vaginal vault. No bleeding. No vaginal lesions. No CMT.   Musculoskeletal: No deformities appreciated. Normal ROM. No edema noted.  Neurological: Alert and Oriented x 3. Speech normal. Moves all extremities equally.  Psychiatric: Appropriate mood, affect, and behavior.   Skin: Skin is warm and dry. no jaundice.       Emergency Department Course     Imaging:  Radiographic findings were communicated with the patient who voiced understanding of the findings.  CT Abdomen Pelvis w Contrast   Final Result   IMPRESSION:    1.  Large amount of inflammation within the lower abdomen extending into the pelvis. This appears to be centered upon the upper sigmoid colon where there is localized low-attenuation wall thickening. Reactive thickening in the adjacent distal ileum.    There is a 4.2 x 3.1 x 1.1 cm complex fluid collection along the anterior aspect of the uterus suspicious for developing abscess with some complex fluid also seen in the pelvic cul-de-sac. It is unclear whether this is due to a sigmoid colonic source    inflammation or could be due to pelvic inflammatory disease.      2.  No appendicitis.      3.  Malpositioned low-lying IUD with the stem within the lower uterus and cervix with probable embedment of the right arm within the myometrium.         US Pelvic Complete with Transvaginal    (Results Pending)         Laboratory  Labs Ordered and Resulted from Time of ED Arrival Up to the Time of Departure from the ED   CBC WITH PLATELETS DIFFERENTIAL - Abnormal; Notable for the following components:       Result Value    WBC 14.7 (*)     Absolute Neutrophil 10.9 (*)     All other components within normal limits   BASIC METABOLIC PANEL - Abnormal; Notable for the following components:    Glucose 111 (*)     All other components within normal limits   ROUTINE UA WITH MICROSCOPIC - Abnormal; Notable for the following components:    Leukocyte Esterase Urine Small (*)     WBC Urine 14  (*)     RBC Urine 4 (*)     All other components within normal limits      HCG: negative  Wet Prep: pending  Gonorrhea PCR: pending  Chlamydia PCR: pending      Interventions:  Medications   HYDROmorphone (PF) (DILAUDID) injection 0.5 mg (0.5 mg Intravenous Given 20 0120)   0.9% sodium chloride BOLUS (1,000 mLs Intravenous New Bag 20 0002)   ondansetron (ZOFRAN) injection 4 mg (4 mg Intravenous Given 20 0002)   iopamidol (ISOVUE-370) solution 500 mL (100 mLs Intravenous Given 20 0018)   sodium chloride 0.9 % bag 500mL for CT scan flush use (65 mLs Intravenous Given 20 0018)   piperacillin-tazobactam (ZOSYN) infusion 3.375 g (0 g Intravenous Stopped 20 0148)          Emergency Department Course:  Past medical records, nursing notes, and vitals reviewed.  I obtained history and performed an exam of the patient as documented above.     IV inserted. Medicine administered as documented above. Blood drawn. This was sent to the lab for further testing, results above.    The patient was sent for a CT abd/pelvis and pelvic ultrasound while in the emergency department, findings above.     I rechecked the patient and discussed the results of her workup thus far.     0134: I discussed the patient with Dr. Bonilla, who will admit the patient to an inpatient bed for continued management.     0148: I discussed the patient with Dr. Mendez of colorectal surgery    0158: I discussed the patient with Dr. Mendez of colorectal surgery again as well as with Dr. Bonilla again.     Patient will be admitted to an inpatient bed for continued management.        Impression & Plan      Medical Decision Makin-year-old female presenting with abdominal pain.  Differential diagnosis considered includes appendicitis, diverticulitis, ovarian cyst, ovarian torsion, PID, TOA, UTI, renal colic, among others.  Patient is afebrile on arrival and well-appearing, but does have significant abdominal tenderness on exam  without rebound or guarding.  She denies any urinary symptoms and her urinalysis is not convincing of infection.  She is not pregnant.  Her labs are notable for a leukocytosis.  Given her tenderness on exam, CT abdomen pelvis with contrast was obtained.  This showed a large amount of inflammation in the lower abdomen around the sigmoid colon with a fluid collection anterior to the uterus.  This is concerning for developing abscess and is unclear on CT whether it is coming from sigmoid colon or uterus/ovaries.  I did perform pelvic exam and she has only very small amount of discharge and no cervical motion tenderness.  I have lower suspicion that this is PID or TOA.  Wet prep, gonorrhea, chlamydia testing are pending   Though she reports being in a monogamous relationship with 1 partner and is not concerned about STDs.  There was also some concern with malpositioned IUD.  Patient reports that IUD was placed many years ago and therefore this finding is likely chronic and unrelated to the inflammatory process occurring.  At this time I feel that this inflammation seems most likely secondary to diverticulitis with possible developing abscess, though there is no mention of diverticulosis on CT report. Patient was treated with IV Zosyn.  I discussed the patient with Tremaine Bonilla MD of the hospital service who accepted her for admission for continued management.  He did request a colorectal consult to ensure that patient did not require need for IR intervention, as if she did she would likely require transfer.  I discussed the patient with Dr. Mednez who reviewed of colorectal surgery, her CT images and felt that this fluid collection was more consistent with phlegmon and that there was nothing that would be amenable to drainage at this time and therefore felt she would be okay to be admitted here. Patient admitted to inpatient bed in stable condition.     Diagnosis:    ICD-10-CM    1. Intra-abdominal abscess (H) K65.1 UA  with Microscopic     Wet prep       Disposition:  Admitted    Annel Marie PA-C  1/11/2020   [facility] EMERGENCY DEPARTMENT       Annel Marie PA-C  01/12/20 0951

## 2020-01-12 NOTE — PLAN OF CARE
Pt continues to have RUQ, RLQ pain.  Better pain control with use of morphine 2mg x1. Vitals are stable.  Pt is voiding, adalberto with sediment 200cc recorded this shift so far.  Kept NPO with sip of water for tylenol. Await diet recommendation per CRS.  OB/gyn orders to start flagyl and vibramycin.  Will continue zosyn.  Bowel sounds are active and audible.  Vital signs remain stable.

## 2020-01-12 NOTE — PROGRESS NOTES
Brief Progress Note:    H&P from admitting provider reviewed.  Patient seen and examined.    This is a 34-year-old female with a history of bipolar disorder who came in with worsening abdominal pain over the last 2-3 days.  She was found to have leukocytosis with significant abdominal tenderness.  CT scan of the belly showed fluid collection adjacent to the sigmoid colon with adjacent wall thickening versus possible gynecologic infection.  Her wet prep did show clue cells and positive for trichomonas.  Patient has remained hemodynamically stable through admission.  She is ambulating without difficulty.    Discussed with both surgery and OB/GYN.  At this point, surgery does not feel the fluid collection would be easily accessible (they did discuss this with radiology).  Recommending bowel rest and antibiotics.  Given positive trichomonas with pelvic abscess there was concern for intra-abdominal abscess being secondary to PID.  As such antibiotics have been adjusted by OB/GYN.    Vitals:    01/12/20 0230 01/12/20 0256 01/12/20 0417 01/12/20 0809   BP: 105/58 123/87 111/58 108/60   Pulse:       Resp:  20 18 24   Temp:  98  F (36.7  C) 98.3  F (36.8  C) 98.7  F (37.1  C)   TempSrc:  Oral Oral Oral   SpO2:  97% 97% 92%     Generally, patient is ambulating through the room without difficulty.  Mother and best friend are at bedside.  Heart is regular without murmur.  Not tachycardic.  Normal work of breathing.  Clear bilaterally.  Abdomen is soft.  Bowel sounds are present.  There is tenderness to deep palpation in the mid abdomen without rebound or guarding.  Extremities are warm and well-perfused.    Plan:  34-year-old female with a history of bipolar disorder who came in with worsening abdominal pain over the last 2-3 days found to have complex fluid collection along the anterior aspect of the uterus and adjacent to the sigmoid colon suspicious for developing abscess of either GI or  system.    -Zosyn therapy.  OB/GYN  has added on doxycycline for PID and given a dose of Flagyl for trichomonas.  Follow-up on GC testing from the ED.  -Bowel rest, IV fluids.   -Pain control switch to morphine as Dilaudid was not working and only making the patient tired.  -Patient will need to have her IUD removed in the outpatient setting with OB/GYN in approximately 2 weeks.    Justin Rascon MD

## 2020-01-12 NOTE — ED NOTES
[facility]  ED Nurse Handoff Report    Derrick Mix is a 34 year old female   ED Chief complaint: Abdominal Pain  . ED Diagnosis:   Final diagnoses:   Intra-abdominal abscess (H)     Allergies:   Allergies   Allergen Reactions     Latex      Paxil [Paroxetine Mesylate]      Zoloft        Code Status: Full Code  Activity level - Baseline/Home:  Independent. Activity Level - Current:   Stand by Assist. Lift room needed: No. Bariatric: No   Needed: No   Isolation: No. Infection: Not Applicable.     Vital Signs:   Vitals:    01/11/20 2332 01/12/20 0005 01/12/20 0010 01/12/20 0125   BP: 136/83 114/69  123/74   Pulse:  101  99   Resp: 20      Temp: 98.3  F (36.8  C)      TempSrc: Temporal      SpO2: 99%  94% 96%       Cardiac Rhythm:  ,      Pain level: 0-10 Pain Scale: 10  Patient confused: No. Patient Falls Risk: Yes.   Elimination Status: Has voided; ambulates to restroom independently with assist.  Patient Report - Initial Complaint: Abd pain.   Focused Assessment:   HPI   Derrick Mix is a 34 year old female who presents with abdominal pain. Patient reports yesterday she developed RLQ abdominal pain that has progressively worsened since onset. She reports pain is worse with any movement. She has had decreased appetite with nausea, but no vomiting. No diarrhea. No dysuria or hematuria. Tried some tylenol and ibuprofen without relief.    Tests Performed:   CT Abdomen Pelvis w Contrast   Final Result   IMPRESSION:    1.  Large amount of inflammation within the lower abdomen extending into the pelvis. This appears to be centered upon the upper sigmoid colon where there is localized low-attenuation wall thickening. Reactive thickening in the adjacent distal ileum.    There is a 4.2 x 3.1 x 1.1 cm complex fluid collection along the anterior aspect of the uterus suspicious for developing abscess with some complex fluid also seen in the pelvic cul-de-sac. It is unclear whether this is due to  a sigmoid colonic source    inflammation or could be due to pelvic inflammatory disease.      2.  No appendicitis.      3.  Malpositioned low-lying IUD with the stem within the lower uterus and cervix with probable embedment of the right arm within the myometrium.         US Pelvic Complete with Transvaginal    (Results Pending)     Abnormal Results:   Labs Ordered and Resulted from Time of ED Arrival Up to the Time of Departure from the ED   CBC WITH PLATELETS DIFFERENTIAL - Abnormal; Notable for the following components:       Result Value    WBC 14.7 (*)     Absolute Neutrophil 10.9 (*)     All other components within normal limits   BASIC METABOLIC PANEL - Abnormal; Notable for the following components:    Glucose 111 (*)     All other components within normal limits   ROUTINE UA WITH MICROSCOPIC - Abnormal; Notable for the following components:    Leukocyte Esterase Urine Small (*)     WBC Urine 14 (*)     RBC Urine 4 (*)     All other components within normal limits   ISTAT HCG QUANTITATIVE PREGNANCY NURSING POCT   ISTAT HCG QUANTITATIVE PREGNANCY POCT   CHLAMYDIA TRACHOMATIS PCR   NEISSERIA GONORRHOEAE PCR   WET PREP      Treatments provided: Abx, analgesics, 20ga PIV, warm blankets, ice chips, updates to pt on results and plan of care.  Family Comments: No family at bedside.  OBS brochure/video discussed/provided to patient:  Yes  ED Medications:   Medications   HYDROmorphone (PF) (DILAUDID) injection 0.5 mg (0.5 mg Intravenous Given 1/12/20 0120)   piperacillin-tazobactam (ZOSYN) infusion 3.375 g (3.375 g Intravenous New Bag 1/12/20 0120)   0.9% sodium chloride BOLUS (1,000 mLs Intravenous New Bag 1/12/20 0002)   ondansetron (ZOFRAN) injection 4 mg (4 mg Intravenous Given 1/12/20 0002)   iopamidol (ISOVUE-370) solution 500 mL (100 mLs Intravenous Given 1/12/20 0018)   sodium chloride 0.9 % bag 500mL for CT scan flush use (65 mLs Intravenous Given 1/12/20 0018)     Drips infusing:  No  For the majority of  the shift, the patient's behavior Green.      Severe Sepsis OR Septic Shock Diagnosis Present: No      ED Nurse Name/Phone Number: Pili Silvestre RN,   1:43 AM    RECEIVING UNIT ED HANDOFF REVIEW    Above ED Nurse Handoff Report was reviewed: YES  Reviewed by: Kristina Cruz RN on January 12, 2020 at 2:09 AM

## 2020-01-12 NOTE — CONSULTS
[facility]  Colon and Rectal Surgery Consult Note  Name: Derrick Mix    MRN: [id number]  YOB: 1985    Age: 34 year old  Date of admission: 1/11/2020  Primary care provider: No Ref-Primary, Physician     Requesting Physician:  Dr. Bonilla  Reason for consult:  Pelvic pain and infection           History of Present Illness:   Derrick Mix is a 34 year old female, seen at the request of Dr. Bonilla, presents with 3 days of abdominal and pelvic pain.  Patient has been menstruating for a week.  Had cramps associated with this, but then the pain became more severe and sharp.  She is passing gas and bm.  No rectal bleeding.  Presented to ED.  ED physician did not think the pain was gyn in origin based on pelvic exam.  CT scan obtained and fluid seen in pelvis, possible diverticulitis.        Colonoscopy History:  none            Past Medical History:     Past Medical History:   Diagnosis Date     Bipolar 1 disorder (H)      Hirsutism      Morbid obesity (H)      Tobacco use      Uncomplicated asthma              Past Surgical History:     Past Surgical History:   Procedure Laterality Date     HERNIA REPAIR      Left inguinal repair at 7 YO               Social History:     Social History     Tobacco Use     Smoking status: Current Some Day Smoker     Packs/day: 1.00     Types: Cigarettes   Substance Use Topics     Alcohol use: Yes             Family History:   No family history on file.          Allergies:     Allergies   Allergen Reactions     Latex      Paxil [Paroxetine Mesylate]      Zoloft              Medications:       doxycycline hyclate  100 mg Oral Q12H HEMA     metroNIDAZOLE  2,000 mg Oral Once     piperacillin-tazobactam  3.375 g Intravenous Q6H     sodium chloride (PF)  3 mL Intracatheter Q8H             Review of Systems:   A comprehensive greater than 10 system review of systems was carried out.  Pertinent positives and negatives are noted above.  Otherwise negative for contributory  info.            Physical Exam:     Blood pressure 108/60, pulse 97, temperature 98.7  F (37.1  C), temperature source Oral, resp. rate 24, SpO2 92 %, unknown if currently breastfeeding.    Intake/Output Summary (Last 24 hours) at 1/12/2020 1143  Last data filed at 1/12/2020 0528  Gross per 24 hour   Intake 1000 ml   Output --   Net 1000 ml     Exam:  General - Awake alert and oriented, appears  stated age  Head, Eyes, ENT: normocephalic, atraumatic, pupils equal, round and sclera anicteric  Pulm - Non-labored breathing with normal respiratory effort  CVS - reg rate and rhythm, no peripheral edema  Abd - obese, soft, tender in lower abdomen, no peritoneal signs, but somewhat difficult to examine, Rectal exam was not performed.   Neuro - CN II-XII grossly intact  Musculoskeletal - extremities with no clubbing, cyanosis or edema; able to ambulate  Psych - responsive, alert, cooperative; oriented x3; appropriate mood and affect  External/skin - inspection reveals no rashes, lesions or ulcers, normal coloring           Data Reviewed:     Results for orders placed or performed during the hospital encounter of 01/11/20   CT Abdomen Pelvis w Contrast    Narrative    EXAM: CT ABDOMEN PELVIS W CONTRAST  LOCATION: [facility]  DATE/TIME: 1/12/2020 12:15 AM    INDICATION: Right lower quadrant abdominal pain.  COMPARISON: None.  TECHNIQUE: CT scan of the abdomen and pelvis was performed following injection of IV contrast. Multiplanar reformats were obtained. Dose reduction techniques were used.  CONTRAST: 100 mL Isovue-370.    FINDINGS:   LOWER CHEST: Normal.    HEPATOBILIARY: Fatty liver.    PANCREAS: Normal.    SPLEEN: Normal.    ADRENAL GLANDS: Normal.    KIDNEYS/BLADDER: Normal.    BOWEL: There is a large amount of inflammatory change involving the lower abdomen extending into the pelvis. This appears to be centered upon the upper sigmoid colon where there is localized wall thickening. There is a small complex  fluid collection   along the anterior aspect of the uterus with early peripheral enhancement measuring 4.2 x 3.1 x 1.1 cm suspicious for abscess. Appendix is normal. No appendicitis. There is reactive wall thickening in the adjacent distal ileum.    LYMPH NODES: Normal.    VASCULATURE: Unremarkable.    PELVIC ORGANS: Malpositioned low-lying intrauterine device with the stem within the cervix and probable embedment of the right arm within the myometrium.    OTHER: None.    MUSCULOSKELETAL: Normal.      Impression    IMPRESSION:   1.  Large amount of inflammation within the lower abdomen extending into the pelvis. This appears to be centered upon the upper sigmoid colon where there is localized low-attenuation wall thickening. Reactive thickening in the adjacent distal ileum.   There is a 4.2 x 3.1 x 1.1 cm complex fluid collection along the anterior aspect of the uterus suspicious for developing abscess with some complex fluid also seen in the pelvic cul-de-sac. It is unclear whether this is due to a sigmoid colonic source   inflammation or could be due to pelvic inflammatory disease.    2.  No appendicitis.    3.  Malpositioned low-lying IUD with the stem within the lower uterus and cervix with probable embedment of the right arm within the myometrium.     US Pelvic Complete with Transvaginal    Narrative    EXAM: US PELVIC COMPLETE WITH TRANSVAGINAL  LOCATION: [facility]  DATE/TIME: 1/12/2020 2:10 AM    INDICATION: Pelvic pain, possible abscess  COMPARISON: CT dated 01/12/2020  TECHNIQUE: Transabdominal scans were performed. Endovaginal ultrasound was performed to better visualize the adnexa. Color flow with spectral Doppler and waveform analysis performed.    FINDINGS:    UTERUS: 9.4 x 4.8 x 6.2 cm. Normal in size and position with no masses. A 3.6 x 1.5 x 3.4 cm complex collection is seen along the anterior uterine fundus, corresponding to fluid collection seen on CT performed earlier same  day.    ENDOMETRIUM: 4 mm. IUD in oblique position within the uterus.    RIGHT OVARY: 4.6 x 2.7 x 2 point cm. Normal with arterial and venous duplex flow identified.    LEFT OVARY: 5.3 x 3.4 x 3.1 cm. Normal with arterial and venous duplex flow identified.    No significant free fluid.      Impression    IMPRESSION:    1.  3.6 x 1.5 x 3.4 cm fluid collection anterior to the uterine fundus corresponding to fluid collection seen on CT, may reflect abscess in the proper clinical context.  2.  IUD.  3.  No ovarian torsion.               Recent Labs   Lab 01/12/20  0657 01/11/20  2357   WBC 13.2* 14.7*   HGB 12.4 13.6   HCT 38.2 42.1   MCV 92 92    234          Lab Results   Component Value Date     01/12/2020     01/11/2020     09/18/2014    Lab Results   Component Value Date    CHLORIDE 110 01/12/2020    CHLORIDE 105 01/11/2020    CHLORIDE 109 09/18/2014    Lab Results   Component Value Date    BUN 8 01/12/2020    BUN 7 01/11/2020    BUN 13 09/18/2014      Lab Results   Component Value Date    POTASSIUM 3.7 01/12/2020    POTASSIUM 3.8 01/11/2020    POTASSIUM 4.0 09/18/2014    Lab Results   Component Value Date    CO2 27 01/12/2020    CO2 26 01/11/2020    CO2 27 09/18/2014    Lab Results   Component Value Date    CR 0.77 01/12/2020    CR 0.81 01/11/2020    CR 0.86 09/18/2014            Assessment and Plan:   33 yo F with acute onset of pelvic pain after her period. She is tender in the LLQ.  Has an IUD in place.  CT scan shows a small fluid collection adjacent to the sigmoid colon with a small amount of wall thickening there.  She has a few scattered diverticuli.  She also has a vaginal prep showing clue cells and Trichamonas.  She has an IUD in place.      Certainly could represent diverticulitis with an adjacent fluid collection, possibly evolving abscess.  An infection related to the /GYN system could also be present and/or culprit.  Given that she has an IUD in place and trichamonas and  clue cells seen on her wet pre, consider consult Ob/Gyn to see if the IUD needs to be removed.  I discussed the case with IR.  The radiologist is not confident that the fluid can be easily accessed.  If it can be accessed, it is too small to leave a drain and at most could be aspirated.  Diverticular associated abscesses of this size will usually respond to bowel rest and antibiotics.  Advised her that urgent surgical intervention could necessitate a stoma.  Plan:  1. Admit to hospitalists  2. Surgery: non operative management  3. Diet: npo except ice chips  4. IV Fluids: per hospitalists  5. Antibiotics:  per hospitalists - cont zosyn  6. Medications:  OK for home meds   7. I&O s:  strict I&O s  8. Labs:   - Reviewed: cbc, bmp  - Ordered: none   9. Imaging:   - I have reviewed the CT' of abdomen and pelvis  - Ordered: none  10. Activity:  up ad helene  11. DVT prophylaxis: SCD s,     Patient specific identified risk factors considered as part of today s evaluation include: obesity.    Additional history obtained from chart    Total time spent on consultation: 30    Time spent on counseling and coordinating care activities: 20

## 2020-01-12 NOTE — H&P
[facility] History and Physical    Derrick Mix MRN# [id number]   Age: 34 year old YOB: 1985     Date of Admission:  1/11/2020    Home clinic: Not established          Assessment and Plan:   Assessment:   Derrick Mix is a 34-year-old woman with a history of bipolar disorder and uncomplicated asthma who came to attention due to abdominal pain.    In the emergency department, the patient is noted to be hemodynamically stable and without a fever.  However, her white cell count was elevated nearly 15,000 and her abdomen was significantly tender, which prompted a CT scan of the abdomen.  She was found to have a fairly large inflammatory mass in the pelvis along the sigmoid colon.  The emergency room provider did contact colorectal surgery who felt that the patient did not urgently need drainage of the apparent fluid collection, thinking that it was not yet matured to an abscess.    Dx:  1.  Pelvic inflammatory mass, probably phlegmon dud to perforated diverticulitis.    2.  Tobacco abuse.  3.  Moderate obesity.  Patient has the appearance of PCOS with some evident hirsutism though she indicated that she is not diabetic.      Plan:   1.  Admit to inpatient.  2.  N.p.o. except ice chips and medications.  3.  Colorectal surgery consultation is requested.  4.  Continue with Zosyn IV.             Chief Complaint:   Severe abdominal pain.     History is obtained from the patient, electronic medical record and emergency room provider.    Ms. Mix had some discomfort this past week but dismissed it as possible menstrual cramps.  She does however recall significant increasing pain with passing bowel movements.  About 2 days prior to admission, on Friday 1/9/20 around noon, she noticed dramatic increase in the abdominal pain along with shaking chills.  She did not actually have measured fever.  She did not have significant nausea or vomiting.  Ultimately, when the pain became intolerable, she came  to the emergency department today.    Although the patient has significant pain with defecating, she has not had diarrhea.  She has not passed any blood except what she thinks may have come from hemorrhoids.  She has not had significant weight loss.  The duration of her discomfort with passing stools does beg the question of how long she has been having symptoms of probable diverticulitis.        Past Medical History:     Past Medical History:   Diagnosis Date     Bipolar 1 disorder (H)      Uncomplicated asthma              Past Surgical History:      Past Surgical History:   Procedure Laterality Date     HERNIA REPAIR               Social History:   Patient states that she recently has gotten a job with health benefits.  She resides with her son and a friend.  Patient reports that she smokes about a pack cigarettes a day.  She is a nondrinker.         Family History:   No family history on file.  Family history reviewed but considered noncontributory.           Allergies:     Allergies   Allergen Reactions     Latex      Paxil [Paroxetine Mesylate]      Zoloft              Medications:     No medications prior to admission.             Review of Systems:   A comprehensive review of systems was performed and found to be negative except as described in this note           Physical Exam:   Vitals were reviewed  Temp: 98.3  F (36.8  C) Temp src: Oral BP: 111/58 Pulse: 97 Heart Rate: 99 Resp: 18 SpO2: 97 % O2 Device: None (Room air)    Constitutional: Awake, alert, cooperative, no apparent distress, and appears stated age.  Eyes: Lids and lashes normal, pupils equal, round and reactive to light, extra ocular muscles intact, sclera clear, conjunctiva normal.  ENT: Normocephalic, without obvious abnormality, atraumatic.  Hirsute.  Neck: Supple, symmetrical, trachea midline, no adenopathy, thyroid symmetric, not enlarged and no tenderness, skin normal.  Hematologic / Lymphatic: No cervical lymphadenopathy and no  supraclavicular lymphadenopathy.  Lungs: No increased work of breathing, good air exchange, clear to auscultation bilaterally, no crackles or wheezing.  Cardiovascular: Regular rate and rhythm, normal S1 and S2, no S3 or S4, and no murmur noted.  Abdomen: Decreased bowel sounds.  Significant rebound tenderness.  Guarding is noted mostly over the right and she is certainly extremely tender in the right lower quadrant.  Minimal if any abdominal distention.  Musculoskeletal: No redness, warmth, or swelling of the joints.  Tone is normal.  Neurologic: Awake, alert, oriented to name, place and time.  Cranial nerves II-XII are grossly intact.    Neuropsychiatric: Normal affect.  Appears appropriate to the situation.  Skin: No rashes, erythema, pallor, petechia or purpura.          Data:     Results for orders placed or performed during the hospital encounter of 01/11/20 (from the past 24 hour(s))   ISTAT HCG Quantitative Pregnancy POCT   Result Value Ref Range    HCG Quantitative Serum <5.0 <5.0 IU/L   CBC with platelets differential   Result Value Ref Range    WBC 14.7 (H) 4.0 - 11.0 10e9/L    RBC Count 4.56 3.8 - 5.2 10e12/L    Hemoglobin 13.6 11.7 - 15.7 g/dL    Hematocrit 42.1 35.0 - 47.0 %    MCV 92 78 - 100 fl    MCH 29.8 26.5 - 33.0 pg    MCHC 32.3 31.5 - 36.5 g/dL    RDW 12.7 10.0 - 15.0 %    Platelet Count 234 150 - 450 10e9/L    Diff Method Automated Method     % Neutrophils 74.2 %    % Lymphocytes 19.0 %    % Monocytes 5.0 %    % Eosinophils 1.0 %    % Basophils 0.3 %    % Immature Granulocytes 0.5 %    Nucleated RBCs 0 0 /100    Absolute Neutrophil 10.9 (H) 1.6 - 8.3 10e9/L    Absolute Lymphocytes 2.8 0.8 - 5.3 10e9/L    Absolute Monocytes 0.7 0.0 - 1.3 10e9/L    Absolute Eosinophils 0.2 0.0 - 0.7 10e9/L    Absolute Basophils 0.0 0.0 - 0.2 10e9/L    Abs Immature Granulocytes 0.1 0 - 0.4 10e9/L    Absolute Nucleated RBC 0.0    Basic metabolic panel   Result Value Ref Range    Sodium 136 133 - 144 mmol/L     Potassium 3.8 3.4 - 5.3 mmol/L    Chloride 105 94 - 109 mmol/L    Carbon Dioxide 26 20 - 32 mmol/L    Anion Gap 5 3 - 14 mmol/L    Glucose 111 (H) 70 - 99 mg/dL    Urea Nitrogen 7 7 - 30 mg/dL    Creatinine 0.81 0.52 - 1.04 mg/dL    GFR Estimate >90 >60 mL/min/[1.73_m2]    GFR Estimate If Black >90 >60 mL/min/[1.73_m2]    Calcium 8.8 8.5 - 10.1 mg/dL   CT Abdomen Pelvis w Contrast    Narrative    EXAM: CT ABDOMEN PELVIS W CONTRAST  LOCATION: [facility]  DATE/TIME: 1/12/2020 12:15 AM    INDICATION: Right lower quadrant abdominal pain.  COMPARISON: None.  TECHNIQUE: CT scan of the abdomen and pelvis was performed following injection of IV contrast. Multiplanar reformats were obtained. Dose reduction techniques were used.  CONTRAST: 100 mL Isovue-370.    FINDINGS:   LOWER CHEST: Normal.    HEPATOBILIARY: Fatty liver.    PANCREAS: Normal.    SPLEEN: Normal.    ADRENAL GLANDS: Normal.    KIDNEYS/BLADDER: Normal.    BOWEL: There is a large amount of inflammatory change involving the lower abdomen extending into the pelvis. This appears to be centered upon the upper sigmoid colon where there is localized wall thickening. There is a small complex fluid collection   along the anterior aspect of the uterus with early peripheral enhancement measuring 4.2 x 3.1 x 1.1 cm suspicious for abscess. Appendix is normal. No appendicitis. There is reactive wall thickening in the adjacent distal ileum.    LYMPH NODES: Normal.    VASCULATURE: Unremarkable.    PELVIC ORGANS: Malpositioned low-lying intrauterine device with the stem within the cervix and probable embedment of the right arm within the myometrium.    OTHER: None.    MUSCULOSKELETAL: Normal.      Impression    IMPRESSION:   1.  Large amount of inflammation within the lower abdomen extending into the pelvis. This appears to be centered upon the upper sigmoid colon where there is localized low-attenuation wall thickening. Reactive thickening in the adjacent distal  ileum.   There is a 4.2 x 3.1 x 1.1 cm complex fluid collection along the anterior aspect of the uterus suspicious for developing abscess with some complex fluid also seen in the pelvic cul-de-sac. It is unclear whether this is due to a sigmoid colonic source   inflammation or could be due to pelvic inflammatory disease.    2.  No appendicitis.    3.  Malpositioned low-lying IUD with the stem within the lower uterus and cervix with probable embedment of the right arm within the myometrium.     UA with Microscopic   Result Value Ref Range    Color Urine Light Yellow     Appearance Urine Clear     Glucose Urine Negative NEG^Negative mg/dL    Bilirubin Urine Negative NEG^Negative    Ketones Urine Negative NEG^Negative mg/dL    Specific Gravity Urine 1.005 1.003 - 1.035    Blood Urine Negative NEG^Negative    pH Urine 7.0 5.0 - 7.0 pH    Protein Albumin Urine Negative NEG^Negative mg/dL    Urobilinogen mg/dL Normal 0.0 - 2.0 mg/dL    Nitrite Urine Negative NEG^Negative    Leukocyte Esterase Urine Small (A) NEG^Negative    Source Midstream Urine     WBC Urine 14 (H) 0 - 5 /HPF    RBC Urine 4 (H) 0 - 2 /HPF    Squamous Epithelial /HPF Urine 1 0 - 1 /HPF   Wet prep   Result Value Ref Range    Specimen Description Vagina     Wet Prep Motile Trichomonas seen (A)     Wet Prep No yeast seen     Wet Prep Many  PMNs seen       Wet Prep Clue cells seen (A)    US Pelvic Complete with Transvaginal    Narrative    EXAM: US PELVIC COMPLETE WITH TRANSVAGINAL  LOCATION: [facility]  DATE/TIME: 1/12/2020 2:10 AM    INDICATION: Pelvic pain, possible abscess  COMPARISON: CT dated 01/12/2020  TECHNIQUE: Transabdominal scans were performed. Endovaginal ultrasound was performed to better visualize the adnexa. Color flow with spectral Doppler and waveform analysis performed.    FINDINGS:    UTERUS: 9.4 x 4.8 x 6.2 cm. Normal in size and position with no masses. A 3.6 x 1.5 x 3.4 cm complex collection is seen along the anterior  uterine fundus, corresponding to fluid collection seen on CT performed earlier same day.    ENDOMETRIUM: 4 mm. IUD in oblique position within the uterus.    RIGHT OVARY: 4.6 x 2.7 x 2 point cm. Normal with arterial and venous duplex flow identified.    LEFT OVARY: 5.3 x 3.4 x 3.1 cm. Normal with arterial and venous duplex flow identified.    No significant free fluid.      Impression    IMPRESSION:    1.  3.6 x 1.5 x 3.4 cm fluid collection anterior to the uterine fundus corresponding to fluid collection seen on CT, may reflect abscess in the proper clinical context.  2.  IUD.  3.  No ovarian torsion.         Lactic acid level STAT   Result Value Ref Range    Lactate for Sepsis Protocol 0.5 (L) 0.7 - 2.0 mmol/L   CBC with platelets   Result Value Ref Range    WBC 13.2 (H) 4.0 - 11.0 10e9/L    RBC Count 4.15 3.8 - 5.2 10e12/L    Hemoglobin 12.4 11.7 - 15.7 g/dL    Hematocrit 38.2 35.0 - 47.0 %    MCV 92 78 - 100 fl    MCH 29.9 26.5 - 33.0 pg    MCHC 32.5 31.5 - 36.5 g/dL    RDW 12.8 10.0 - 15.0 %    Platelet Count 220 150 - 450 10e9/L       All imaging studies reviewed by me.      Attestation:  I have reviewed today's vital signs, notes, medications, labs and imaging.  Total time: 35 minutes     Tremaine Bonilla MD

## 2020-01-12 NOTE — CONSULTS
"Gynecology Consult Note    HPI:  Derrick Mix is a 34 year old female seen at the request of Dr. Justin Rascon for incidental finding of malpositioned IUD.      The patient she began her most recent menstrual cycle approximately 7 to 10 days ago, which ended last Wednesday or Thursday.  During her menses, the patient reports increased flow as well as cramping.  She states the cramping persisted and significantly increased on Friday, despite her menses stopping which had never happened before.  The patient states by Friday night, she states \"anything hurt.\"  She states she was unable to walk as walking/stepping on the ground because her notable discomfort.  She states initially, the pain was on her right mid abdomen.  Now, it has moved lower down into her pelvis.  In addition to diffuse pelvic discomfort, she does have intermittent right upper quadrant pain as well.  She reports she did have associated chills and fever, although she did not take her temperature, on Friday evening.  No nausea or vomiting.  No shortness of breath or chest pain. No issues with BMs or urination.     The patient states she had an IUD placed in .  She states she has the 10-year IUD.  She states that she usually gets her menses every month.  She does report, however, that over the past 3 to 4 months, her periods are now lasting 5 to 7 days in duration versus the 1 to 3 days prior.  She also notes increased flow as well.  Of note, when asked the patient states she went with a copper IUD given the contraception for 10 years.    The patient reports she is sexually active with one male partner.  She denies any concern for sexually transmitted infections, or new partners.    OB history:   ; x2 abortions and 1     GYN history:   - Menses as above  - reports history of abnormal pap smears, but denies need for cervical procedures   - remote history of GC/Chlam >12 years ago     ROS:  As above.     PMH:  Past Medical History: "   Diagnosis Date     Bipolar 1 disorder (H)      Uncomplicated asthma      PSHx:  Past Surgical History:   Procedure Laterality Date     HERNIA REPAIR      Left inguinal repair at 5 YO       Medications: Denies and PTA medications     Allergies:     Allergies   Allergen Reactions     Latex      Paxil [Paroxetine Mesylate]      Zoloft        Physical Exam:   Vitals:    01/12/20 0230 01/12/20 0256 01/12/20 0417 01/12/20 0809   BP: 105/58 123/87 111/58 108/60   Pulse:       Resp:  20 18 24   Temp:  98  F (36.7  C) 98.3  F (36.8  C) 98.7  F (37.1  C)   TempSrc:  Oral Oral Oral   SpO2:  97% 97% 92%      Gen: Pleasant, NAD, notable signs of excess androgens with diffuse hirsutism   CV: Regular rate  Pulm: No increased work of breathing  Abd: Morbidly obese, mild tenderness to pelvis and RUQ without rebound or guarding.     Imaging:     EXAM: US PELVIC COMPLETE WITH TRANSVAGINAL  LOCATION: [facility]  DATE/TIME: 1/12/2020 2:10 AM     INDICATION: Pelvic pain, possible abscess  COMPARISON: CT dated 01/12/2020  TECHNIQUE: Transabdominal scans were performed. Endovaginal ultrasound was performed to better visualize the adnexa. Color flow with spectral Doppler and waveform analysis performed.     FINDINGS:     UTERUS: 9.4 x 4.8 x 6.2 cm. Normal in size and position with no masses. A 3.6 x 1.5 x 3.4 cm complex collection is seen along the anterior uterine fundus, corresponding to fluid collection seen on CT performed earlier same day.     ENDOMETRIUM: 4 mm. IUD in oblique position within the uterus.     RIGHT OVARY: 4.6 x 2.7 x 2 point cm. Normal with arterial and venous duplex flow identified.     LEFT OVARY: 5.3 x 3.4 x 3.1 cm. Normal with arterial and venous duplex flow identified.     No significant free fluid.                                                                      IMPRESSION:    1.  3.6 x 1.5 x 3.4 cm fluid collection anterior to the uterine fundus corresponding to fluid collection seen on CT,  may reflect abscess in the proper clinical context.  2.  IUD.  3.  No ovarian torsion.    Labs:  Component      Latest Ref Rng & Units 1/12/2020 1/12/2020 1/12/2020           1:03 AM  1:03 AM  1:03 AM   WBC      4.0 - 11.0 10e9/L      RBC Count      3.8 - 5.2 10e12/L      Hemoglobin      11.7 - 15.7 g/dL      Hematocrit      35.0 - 47.0 %      MCV      78 - 100 fl      MCH      26.5 - 33.0 pg      MCHC      31.5 - 36.5 g/dL      RDW      10.0 - 15.0 %      Platelet Count      150 - 450 10e9/L      Specimen Description       Vagina     Wet Prep       Motile Trichomonas seen (A) No yeast seen Many . . .     Component      Latest Ref Rng & Units 1/12/2020 1/12/2020           1:03 AM  6:57 AM   WBC      4.0 - 11.0 10e9/L  13.2 (H)   RBC Count      3.8 - 5.2 10e12/L  4.15   Hemoglobin      11.7 - 15.7 g/dL  12.4   Hematocrit      35.0 - 47.0 %  38.2   MCV      78 - 100 fl  92   MCH      26.5 - 33.0 pg  29.9   MCHC      31.5 - 36.5 g/dL  32.5   RDW      10.0 - 15.0 %  12.8   Platelet Count      150 - 450 10e9/L  220   Specimen Description           Wet Prep       Clue cells seen (A)      EXAM: CT ABDOMEN PELVIS W CONTRAST  LOCATION: [facility]  DATE/TIME: 1/12/2020 12:15 AM     INDICATION: Right lower quadrant abdominal pain.  COMPARISON: None.  TECHNIQUE: CT scan of the abdomen and pelvis was performed following injection of IV contrast. Multiplanar reformats were obtained. Dose reduction techniques were used.  CONTRAST: 100 mL Isovue-370.     FINDINGS:   LOWER CHEST: Normal.     HEPATOBILIARY: Fatty liver.     PANCREAS: Normal.     SPLEEN: Normal.     ADRENAL GLANDS: Normal.     KIDNEYS/BLADDER: Normal.     BOWEL: There is a large amount of inflammatory change involving the lower abdomen extending into the pelvis. This appears to be centered upon the upper sigmoid colon where there is localized wall thickening. There is a small complex fluid collection   along the anterior aspect of the uterus with early  peripheral enhancement measuring 4.2 x 3.1 x 1.1 cm suspicious for abscess. Appendix is normal. No appendicitis. There is reactive wall thickening in the adjacent distal ileum.     LYMPH NODES: Normal.     VASCULATURE: Unremarkable.     PELVIC ORGANS: Malpositioned low-lying intrauterine device with the stem within the cervix and probable embedment of the right arm within the myometrium.     OTHER: None.     MUSCULOSKELETAL: Normal.                                                                      IMPRESSION:   1.  Large amount of inflammation within the lower abdomen extending into the pelvis. This appears to be centered upon the upper sigmoid colon where there is localized low-attenuation wall thickening. Reactive thickening in the adjacent distal ileum.   There is a 4.2 x 3.1 x 1.1 cm complex fluid collection along the anterior aspect of the uterus suspicious for developing abscess with some complex fluid also seen in the pelvic cul-de-sac. It is unclear whether this is due to a sigmoid colonic source   inflammation or could be due to pelvic inflammatory disease.     2.  No appendicitis.     3.  Malpositioned low-lying IUD with the stem within the lower uterus and cervix with probable embedment of the right arm within the myometrium.    A&P:  Derrick Mix is a 34 year old  female who is HD#2 admitted with abdominal pain found to have a 4.2 complex fluid collection along the anterior aspect of her uterus suspicious for developing abscess, unclear etiology from GI versus  system.  The patient also has incidental finding of malpositioned IUD both on CT scan as well as pelvic ultrasound.    Trichomonas:   - Trichomonas infection: Would recommend 2 grams of oral Metronidazole once; this was ordered.    Pelvic abscess, cannot exclude PID:  - In light of positive trichomonas STD as above, there is concern for intraabdominal abscess being secondary to PID.  GC/Chlam cultures from ED are currently pending.   Would treat preemptively.  Can continued with Zosyn every 6 hours as scheduled but would add Doxycycline 100 mg IV or PO every 12 hours.  PO Doxycycline was ordered.   - Pending GC/Chlam results, oral discharge regimen for PID would include PO doxycycline 100 mg BID for a total for a total of 14 days as well as metronidazole 500 mg BID for 14 days.     Malpositioned IUD:   -Given the pelvic ultrasound showed concerns for oblique position, I would not recommend removal of her IUD inpatient.  Rather, I discussed with the patient coming to an OB/GYN clinic in approximately 2 weeks to see how she is doing from the abdominal pain/pelvic pain standpoint and to have her IUD removed, as this may require an IUD hook and or under ultrasound guidance.  I also discussed if her IUD is removed, that I would recommend replacement, which should be appropriate with resolving PID.  I did discuss with the patient given her irregular menses, obvious signs of hirsutism and obesity, that she is at increased risk of developing endometrial cancer for which, counteracted with Mirena IUD placement and daily progesterone.  The patient states she did have a Mirena IUD placed several years ago, which cause increased cramping and that she has not attempted again.  I did discuss that each placement of an IUD can be very different, and I would not anticipate that she would have recurrent abdominal/pelvic pain with the Mirena IUD.  She states understanding of this, and will consider prior to her follow-up.    Thank you for this consult.  Please do not hesitate to contact us with concerns or questions.       Emily O'Neil, MD [facility]  Pager: [phone]   January 12, 2020, 11:45 AM

## 2020-01-13 LAB
ANION GAP SERPL CALCULATED.3IONS-SCNC: 8 MMOL/L (ref 3–14)
BUN SERPL-MCNC: 8 MG/DL (ref 7–30)
CALCIUM SERPL-MCNC: 8.3 MG/DL (ref 8.5–10.1)
CHLORIDE SERPL-SCNC: 108 MMOL/L (ref 94–109)
CO2 SERPL-SCNC: 23 MMOL/L (ref 20–32)
CREAT SERPL-MCNC: 0.72 MG/DL (ref 0.52–1.04)
ERYTHROCYTE [DISTWIDTH] IN BLOOD BY AUTOMATED COUNT: 12.5 % (ref 10–15)
GFR SERPL CREATININE-BSD FRML MDRD: >90 ML/MIN/{1.73_M2}
GLUCOSE SERPL-MCNC: 82 MG/DL (ref 70–99)
HCT VFR BLD AUTO: 37.3 % (ref 35–47)
HGB BLD-MCNC: 11.9 G/DL (ref 11.7–15.7)
MCH RBC QN AUTO: 29.5 PG (ref 26.5–33)
MCHC RBC AUTO-ENTMCNC: 31.9 G/DL (ref 31.5–36.5)
MCV RBC AUTO: 93 FL (ref 78–100)
PLATELET # BLD AUTO: 208 10E9/L (ref 150–450)
POTASSIUM SERPL-SCNC: 3.5 MMOL/L (ref 3.4–5.3)
RBC # BLD AUTO: 4.03 10E12/L (ref 3.8–5.2)
SODIUM SERPL-SCNC: 139 MMOL/L (ref 133–144)
WBC # BLD AUTO: 8.7 10E9/L (ref 4–11)

## 2020-01-13 PROCEDURE — 25000132 ZZH RX MED GY IP 250 OP 250 PS 637: Performed by: INTERNAL MEDICINE

## 2020-01-13 PROCEDURE — 85027 COMPLETE CBC AUTOMATED: CPT | Performed by: HOSPITALIST

## 2020-01-13 PROCEDURE — 25000128 H RX IP 250 OP 636: Performed by: HOSPITALIST

## 2020-01-13 PROCEDURE — 99207 ZZC CDG-MDM COMPONENT: MEETS LOW - DOWN CODED: CPT | Performed by: HOSPITALIST

## 2020-01-13 PROCEDURE — 36415 COLL VENOUS BLD VENIPUNCTURE: CPT | Performed by: HOSPITALIST

## 2020-01-13 PROCEDURE — 12000000 ZZH R&B MED SURG/OB

## 2020-01-13 PROCEDURE — 25000132 ZZH RX MED GY IP 250 OP 250 PS 637: Performed by: OBSTETRICS & GYNECOLOGY

## 2020-01-13 PROCEDURE — 80048 BASIC METABOLIC PNL TOTAL CA: CPT | Performed by: HOSPITALIST

## 2020-01-13 PROCEDURE — 25000128 H RX IP 250 OP 636: Performed by: INTERNAL MEDICINE

## 2020-01-13 PROCEDURE — 99231 SBSQ HOSP IP/OBS SF/LOW 25: CPT | Performed by: HOSPITALIST

## 2020-01-13 PROCEDURE — 25800030 ZZH RX IP 258 OP 636: Performed by: INTERNAL MEDICINE

## 2020-01-13 RX ADMIN — MORPHINE SULFATE 2 MG: 2 INJECTION, SOLUTION INTRAMUSCULAR; INTRAVENOUS at 00:47

## 2020-01-13 RX ADMIN — SODIUM CHLORIDE: 9 INJECTION, SOLUTION INTRAVENOUS at 00:50

## 2020-01-13 RX ADMIN — ONDANSETRON HYDROCHLORIDE 4 MG: 2 INJECTION, SOLUTION INTRAMUSCULAR; INTRAVENOUS at 19:29

## 2020-01-13 RX ADMIN — TAZOBACTAM SODIUM AND PIPERACILLIN SODIUM 3.38 G: 375; 3 INJECTION, SOLUTION INTRAVENOUS at 13:40

## 2020-01-13 RX ADMIN — MORPHINE SULFATE 2 MG: 2 INJECTION, SOLUTION INTRAMUSCULAR; INTRAVENOUS at 04:59

## 2020-01-13 RX ADMIN — ACETAMINOPHEN 650 MG: 325 TABLET, FILM COATED ORAL at 12:21

## 2020-01-13 RX ADMIN — ACETAMINOPHEN 650 MG: 325 TABLET, FILM COATED ORAL at 00:48

## 2020-01-13 RX ADMIN — MORPHINE SULFATE 2 MG: 2 INJECTION, SOLUTION INTRAMUSCULAR; INTRAVENOUS at 14:37

## 2020-01-13 RX ADMIN — SODIUM CHLORIDE: 9 INJECTION, SOLUTION INTRAVENOUS at 12:21

## 2020-01-13 RX ADMIN — MORPHINE SULFATE 2 MG: 2 INJECTION, SOLUTION INTRAMUSCULAR; INTRAVENOUS at 19:31

## 2020-01-13 RX ADMIN — DOXYCYCLINE HYCLATE 100 MG: 100 CAPSULE ORAL at 19:42

## 2020-01-13 RX ADMIN — MORPHINE SULFATE 2 MG: 2 INJECTION, SOLUTION INTRAMUSCULAR; INTRAVENOUS at 08:21

## 2020-01-13 RX ADMIN — ACETAMINOPHEN 650 MG: 325 TABLET, FILM COATED ORAL at 08:20

## 2020-01-13 RX ADMIN — MORPHINE SULFATE 2 MG: 2 INJECTION, SOLUTION INTRAMUSCULAR; INTRAVENOUS at 11:13

## 2020-01-13 RX ADMIN — ONDANSETRON HYDROCHLORIDE 4 MG: 2 INJECTION, SOLUTION INTRAMUSCULAR; INTRAVENOUS at 04:59

## 2020-01-13 RX ADMIN — TAZOBACTAM SODIUM AND PIPERACILLIN SODIUM 3.38 G: 375; 3 INJECTION, SOLUTION INTRAVENOUS at 00:49

## 2020-01-13 RX ADMIN — ACETAMINOPHEN 650 MG: 325 TABLET, FILM COATED ORAL at 17:08

## 2020-01-13 RX ADMIN — TAZOBACTAM SODIUM AND PIPERACILLIN SODIUM 3.38 G: 375; 3 INJECTION, SOLUTION INTRAVENOUS at 06:57

## 2020-01-13 RX ADMIN — TAZOBACTAM SODIUM AND PIPERACILLIN SODIUM 3.38 G: 375; 3 INJECTION, SOLUTION INTRAVENOUS at 19:43

## 2020-01-13 RX ADMIN — DOXYCYCLINE HYCLATE 100 MG: 100 CAPSULE ORAL at 08:20

## 2020-01-13 RX ADMIN — PROCHLORPERAZINE EDISYLATE 5 MG: 5 INJECTION, SOLUTION INTRAMUSCULAR; INTRAVENOUS at 22:55

## 2020-01-13 RX ADMIN — ONDANSETRON HYDROCHLORIDE 4 MG: 2 INJECTION, SOLUTION INTRAMUSCULAR; INTRAVENOUS at 11:13

## 2020-01-13 ASSESSMENT — ACTIVITIES OF DAILY LIVING (ADL)
ADLS_ACUITY_SCORE: 10

## 2020-01-13 NOTE — PLAN OF CARE
A&O x4. VSS. LS CTA all fields. BS active x4. R sided abdominal tenderness and pain. Intermittent nausea controlled with zofran. NPO with ice chips. IVF at 100mL/hr. IV morphine 2mg managing abd pain. Voiding in good amounts, urine tea colored and patient reports vaginal discharge on tissue paper, writer unable to visualize discharge or differentiate from urine. IV zosyn and PO doxycycline. Up with SBA. Will continue to monitor.

## 2020-01-13 NOTE — PROGRESS NOTES
"GYN--hosp day 2.5  Admitted from ED late 2020, up on floor early morning 20.    Derrick says she is feeling \"much\"better than on admission.  Says still hurts to walk, bumps hurt her abdomen.  She is not sure if she is better because of pain meds, or real clinical ipmovvement.  Has not had fever.  WBC improved to 8.7, down form 13.2 on admission.    Last BM Saturday.  Normally has BM 1-2 times per day.  Is hungry.    Is on Zosyn, doxycycline.  Completed po metronizadole for trichomonas. Has paragard IUD in, may be malpositioned according to CT.  Pt states about 1.5 years ago while removing a high tampon, she pulled the strings off of the IUD.  She says the strings came off in her hand, the IUD is obviously retained. Will ultimately need hysteroscopy then for removal eventually.    OBJECTIVE:  Blood pressure 110/66, pulse 97, temperature 96.2  F (35.7  C), temperature source Oral, resp. rate 20, SpO2 95 %, unknown if currently breastfeeding.    WDWN woman with obesity, hirsutism, alert, oriented, talkative, cheerful.  Hungry.  Abd obese.  States diffusely tender, doesn't seem to be in one area over another on my exam.  Tender RUQ, LUQ, and across lower abd. Not distended. No guarding.    CBC RESULTS:   Recent Labs   Lab Test 20  0648   WBC 8.7   RBC 4.03   HGB 11.9   HCT 37.3   MCV 93   MCH 29.5   MCHC 31.9   RDW 12.5        GC/Chlamydia negative.    ASSESSMENT/PLAN:   Derrick Mix is a 34 year old  female who is HD#2.5 admitted with abdominal pain found to have a 4.2 complex fluid collection along the anterior aspect of her uterus suspicious for developing abscess, unclear etiology from GI versus  system. The patient also has incidental finding of malpositioned IUD both on CT scan as well as pelvic ultrasound.    -pelvic abscess, cannot exclude PID.  Appears to be improving per her account.  I cannot tell if exam is better or worse as I have just met her.  She tells me it is better " but doesn't know if pain medications are the reason.  She is afebrile, and leukocytosis is improving.  Continue zosyn, doxy.  Would agree with Dr. Lindsay's recommendation of completing 14 day course of abx, adding metronidazole to doxy at discharge unless therapy needs to change.    -malpositioned IUD, with hx of her pulling string off.  Will likely ultimately need hysteroscopy for removal, set up as outpatient.    -trichomonas, treated.  GC.Chl negative.    Consider repeat imaging in 1-2 days if not continuing to show clinical improvement.    Samantha Lee MD on 1/13/2020 at 12:21 PM      -

## 2020-01-13 NOTE — PROGRESS NOTES
COLON & RECTAL SURGERY  PROGRESS NOTE    January 13, 2020    SUBJECTIVE:  Patient reports she continues to have sharp right lower abdominal pain.  Pain controlled with morphine.  Complains of nausea, but believes it is related to the pain medications.  She has not vomited.  Last bowel movement was 2 days ago, no blood.    OBJECTIVE:  Temp:  [96.2  F (35.7  C)-98.1  F (36.7  C)] 96.2  F (35.7  C)  Heart Rate:  [83-94] 83  Resp:  [18-20] 20  BP: (104-110)/(64-69) 110/66  SpO2:  [94 %-95 %] 95 %    Intake/Output Summary (Last 24 hours) at 1/13/2020 0854  Last data filed at 1/13/2020 0500  Gross per 24 hour   Intake 1532 ml   Output 1300 ml   Net 232 ml       GENERAL:  Awake, alert, no acute distress, lying in bed.  HEAD: Nomocephalic atraumatic  SCLERA: anicteric  EXTREMITIES: warm and well perfused  ABDOMEN:  Soft, tender over RLQ and lower abdomen, non-distended, no rebound or guarding, no peritoneal signs.      LABS:  Lab Results   Component Value Date    WBC 8.7 01/13/2020     Lab Results   Component Value Date    HGB 11.9 01/13/2020     Lab Results   Component Value Date    HCT 37.3 01/13/2020     Lab Results   Component Value Date     01/13/2020     Last Basic Metabolic Panel:  Lab Results   Component Value Date     01/13/2020      Lab Results   Component Value Date    POTASSIUM 3.5 01/13/2020     Lab Results   Component Value Date    CHLORIDE 108 01/13/2020     Lab Results   Component Value Date    SAHARA 8.3 01/13/2020     Lab Results   Component Value Date    CO2 23 01/13/2020     Lab Results   Component Value Date    BUN 8 01/13/2020     Lab Results   Component Value Date    CR 0.72 01/13/2020     Lab Results   Component Value Date    GLC 82 01/13/2020       ASSESSMENT/PLAN: 34 year old woman admitted with abdominal pain.  CT scan shows a small fluid collection adjacent to the sigmoid colon with a small amount of wall thickening.  Unclear if symptoms due to diverticulitis vs /GYN related  infection.  Afebrile, vital signs stable.  WBC 8.7, improved from 13.2.    - Keep NPO for now  - Continue IVF  - Continue IV abx  - Pain management as needed  - Encourage ambulation  - No plans for surgery at this time from our perspective    For questions/paging, please contact the CRS office at [phone].    Jodi Jenkins PA-C  [facility]  Phone: [phone]    Colon and Rectal Surgery Attending Note    Patient seen and examined independently.  Agree with above assessment and plan.  Derrick is a 34-year-old woman with significant lower abdominal pain for several days.  CT scan shows a small fluid collection adjacent to the sigmoid colon as well as adjacent to the uterus.  She has been on IV antibiotics and her white count has normalized to 8.7.  She is feeling somewhat better walking around the room but still has significant pain.  She continues to pass gas no bowel movement for 2 days. + trichomonas, ? PID    Her abdomen is obese and mild tenderness throughout no focal tenderness rebound or guarding.    Plan    Okay for clear liquids, continue IV antibiotics, minimize narcotics as possible, will consider a colonoscopy when she resolves her acute episode.  No plans for surgery at this time. Appreciate GYN input for possible STD, PID.    Farzana Mishra MD  [facility]  Office Phone # [phone]

## 2020-01-13 NOTE — CONSULTS
Patient's demographics show no PCP. Met with patient & friend at bedside. Offered to provide her with resources for health systems with clinics in [place]. She verbalized agreement. She asked which clinics accepted her insurance. CM recommended she contact the clinics to find out if they accept her insurance. Provided her with information for [employer], [facility] & [facility].     CM will continue to follow patient until discharge for any additional needs.     Patience Rhodes RN, BSN, CPHN, CM  Inpatient Care Coordination  [facility]  [phone]

## 2020-01-13 NOTE — PLAN OF CARE
A/O.  Painful throughout the day to lower abdomen/pelvis-taking IV Morphine and Tylenol along with heating pad. Up ad helene.  Voiding dark, tea-colored urine.  Iv fluids infusing. Started on sips of clear liquids.  Zofran given x1, as Morphine makes her nauseous at times. Continues on IV Zosyn.  Will continue to monitor.

## 2020-01-13 NOTE — PROGRESS NOTES
[facility]    Hospitalist Progress Note    Date of Service (when I saw the patient): 01/13/2020  Provider:  Rob Machuca MD   Text Page  7am - 6PM       Assessment & Plan   Derrick Mix is a 34 year old female who  has a past medical history of Bipolar 1 disorder (H), Hirsutism, Morbid obesity (H), Tobacco use, and Uncomplicated asthma.  She    was admitted on 1/11/2020 due to lower abdominal pain, leukocytosis and CT findings of a pelvic mass, of significant size and suspicious for an abscess.  Nature of the mass is a still unclear, it might be PID or diverticulitis with abscess formation.  Both colorectal surgeon and OB/GYN has been consulted and they are part of the treatment team.    1.  Inflammatory pelvic mass, possible abscess from perforated diverticulitis versus PID.  - On IV antibiotic Zosyn/doxycycline.  Completed metronidazole treatment for trichomonas.  -We will continue to follow recommendation from colorectal surgeon and OB/GYN.  - Pain controlled with current medication.  - Clear liquid diet per surgical service recommendation.    2.  History of mild intermittent asthma.  Controlled with.  3.  History of bipolar 1 disorder.  Not on any home medication listed on admission.  4.  Obesity.  5.  History of tobacco use.     DVT Prophylaxis: Pneumatic Compression Devices  Code Status: Full Code    Disposition: Expected discharge in 1-2 days once able to transition to oral treatment    Interval History   Pain continues to be the worse in the lower abdomen, below the umbilicus.  Exacerbates with movement.  Apparently at home she felt relief with bowel movement.  No fever.    -Data reviewed today: I reviewed all new labs and imaging results over the last 24 hours.     Physical Exam   Temp: 96.2  F (35.7  C) Temp src: Oral BP: 110/66   Heart Rate: 83 Resp: 20 SpO2: 95 % O2 Device: None (Room air)    There were no vitals filed for this visit.  Vital Signs with Ranges  Temp:  [96.2  F (35.7   C)-98.1  F (36.7  C)] 96.2  F (35.7  C)  Heart Rate:  [83-94] 83  Resp:  [18-20] 20  BP: (104-110)/(64-69) 110/66  SpO2:  [94 %-95 %] 95 %  I/O last 3 completed shifts:  In: 1532 [I.V.:1532]  Out: 1300 [Urine:1300]    GEN:  Alert, oriented x 3, appears comfortable, NAD.  HEENT:  Normocephalic/atraumatic, no scleral icterus, no nasal discharge, mouth moist.  CV:  Regular rate and rhythm, no murmur or JVD.  S1 + S2 noted, no S3 or S4.  LUNGS:  Clear to auscultation bilaterally without rales/rhonchi/wheezing/retractions.  Symmetric chest rise on inhalation noted.  ABD:  Active bowel sounds, very tender to light palpation/non-distended.  No rebound/guarding/rigidity.  EXT:  No edema or cyanosis.  No joint synovitis noted.  SKIN:  Dry to touch, no exanthems noted in the visualized areas.       Medications     sodium chloride 100 mL/hr at 01/13/20 1221       doxycycline hyclate  100 mg Oral Q12H HEMA     piperacillin-tazobactam  3.375 g Intravenous Q6H     sodium chloride (PF)  3 mL Intracatheter Q8H       Data   Recent Labs   Lab 01/13/20  0648 01/12/20  0657 01/11/20  2357   WBC 8.7 13.2* 14.7*   HGB 11.9 12.4 13.6   MCV 93 92 92    220 234    139 136   POTASSIUM 3.5 3.7 3.8   CHLORIDE 108 110* 105   CO2 23 27 26   BUN 8 8 7   CR 0.72 0.77 0.81   ANIONGAP 8 2* 5   SAHARA 8.3* 8.2* 8.8   GLC 82 101* 111*       No results found for this or any previous visit (from the past 24 hour(s)).          Disclaimer: This note consists of symbols derived from keyboarding, dictation and/or voice recognition software. As a result, there may be errors in the script that have gone undetected. Please consider this when interpreting information found in this chart.

## 2020-01-14 LAB
ANION GAP SERPL CALCULATED.3IONS-SCNC: 5 MMOL/L (ref 3–14)
BASOPHILS # BLD AUTO: 0 10E9/L (ref 0–0.2)
BASOPHILS NFR BLD AUTO: 0.5 %
BUN SERPL-MCNC: 9 MG/DL (ref 7–30)
CALCIUM SERPL-MCNC: 8.4 MG/DL (ref 8.5–10.1)
CHLORIDE SERPL-SCNC: 108 MMOL/L (ref 94–109)
CO2 SERPL-SCNC: 26 MMOL/L (ref 20–32)
CREAT SERPL-MCNC: 0.83 MG/DL (ref 0.52–1.04)
CRP SERPL-MCNC: 94.7 MG/L (ref 0–8)
DIFFERENTIAL METHOD BLD: NORMAL
EOSINOPHIL # BLD AUTO: 0.4 10E9/L (ref 0–0.7)
EOSINOPHIL NFR BLD AUTO: 6.6 %
ERYTHROCYTE [DISTWIDTH] IN BLOOD BY AUTOMATED COUNT: 12.6 % (ref 10–15)
ERYTHROCYTE [SEDIMENTATION RATE] IN BLOOD BY WESTERGREN METHOD: 39 MM/H (ref 0–20)
GFR SERPL CREATININE-BSD FRML MDRD: >90 ML/MIN/{1.73_M2}
GLUCOSE SERPL-MCNC: 78 MG/DL (ref 70–99)
HCT VFR BLD AUTO: 37.4 % (ref 35–47)
HGB BLD-MCNC: 12 G/DL (ref 11.7–15.7)
IMM GRANULOCYTES # BLD: 0 10E9/L (ref 0–0.4)
IMM GRANULOCYTES NFR BLD: 0.3 %
LYMPHOCYTES # BLD AUTO: 1.1 10E9/L (ref 0.8–5.3)
LYMPHOCYTES NFR BLD AUTO: 18.1 %
MCH RBC QN AUTO: 29.9 PG (ref 26.5–33)
MCHC RBC AUTO-ENTMCNC: 32.1 G/DL (ref 31.5–36.5)
MCV RBC AUTO: 93 FL (ref 78–100)
MONOCYTES # BLD AUTO: 0.4 10E9/L (ref 0–1.3)
MONOCYTES NFR BLD AUTO: 5.8 %
NEUTROPHILS # BLD AUTO: 4.2 10E9/L (ref 1.6–8.3)
NEUTROPHILS NFR BLD AUTO: 68.7 %
NRBC # BLD AUTO: 0 10*3/UL
NRBC BLD AUTO-RTO: 0 /100
PLATELET # BLD AUTO: 226 10E9/L (ref 150–450)
POTASSIUM SERPL-SCNC: 3.8 MMOL/L (ref 3.4–5.3)
RBC # BLD AUTO: 4.01 10E12/L (ref 3.8–5.2)
SODIUM SERPL-SCNC: 139 MMOL/L (ref 133–144)
WBC # BLD AUTO: 6.2 10E9/L (ref 4–11)

## 2020-01-14 PROCEDURE — 80048 BASIC METABOLIC PNL TOTAL CA: CPT | Performed by: HOSPITALIST

## 2020-01-14 PROCEDURE — 86140 C-REACTIVE PROTEIN: CPT | Performed by: HOSPITALIST

## 2020-01-14 PROCEDURE — 25000128 H RX IP 250 OP 636: Performed by: INTERNAL MEDICINE

## 2020-01-14 PROCEDURE — 99232 SBSQ HOSP IP/OBS MODERATE 35: CPT | Performed by: HOSPITALIST

## 2020-01-14 PROCEDURE — 99207 ZZC CDG-MDM COMPONENT: MEETS LOW - DOWN CODED: CPT | Performed by: HOSPITALIST

## 2020-01-14 PROCEDURE — 25800030 ZZH RX IP 258 OP 636: Performed by: INTERNAL MEDICINE

## 2020-01-14 PROCEDURE — 85025 COMPLETE CBC W/AUTO DIFF WBC: CPT | Performed by: HOSPITALIST

## 2020-01-14 PROCEDURE — 25000132 ZZH RX MED GY IP 250 OP 250 PS 637: Performed by: INTERNAL MEDICINE

## 2020-01-14 PROCEDURE — 25000132 ZZH RX MED GY IP 250 OP 250 PS 637: Performed by: OBSTETRICS & GYNECOLOGY

## 2020-01-14 PROCEDURE — 85652 RBC SED RATE AUTOMATED: CPT | Performed by: HOSPITALIST

## 2020-01-14 PROCEDURE — 36415 COLL VENOUS BLD VENIPUNCTURE: CPT | Performed by: HOSPITALIST

## 2020-01-14 PROCEDURE — 25000128 H RX IP 250 OP 636: Performed by: HOSPITALIST

## 2020-01-14 PROCEDURE — 12000000 ZZH R&B MED SURG/OB

## 2020-01-14 RX ADMIN — SODIUM CHLORIDE: 9 INJECTION, SOLUTION INTRAVENOUS at 01:08

## 2020-01-14 RX ADMIN — MORPHINE SULFATE 2 MG: 2 INJECTION, SOLUTION INTRAMUSCULAR; INTRAVENOUS at 00:52

## 2020-01-14 RX ADMIN — ONDANSETRON 4 MG: 4 TABLET, ORALLY DISINTEGRATING ORAL at 23:13

## 2020-01-14 RX ADMIN — ONDANSETRON HYDROCHLORIDE 4 MG: 2 INJECTION, SOLUTION INTRAMUSCULAR; INTRAVENOUS at 16:08

## 2020-01-14 RX ADMIN — PROCHLORPERAZINE EDISYLATE 5 MG: 5 INJECTION, SOLUTION INTRAMUSCULAR; INTRAVENOUS at 00:52

## 2020-01-14 RX ADMIN — TAZOBACTAM SODIUM AND PIPERACILLIN SODIUM 3.38 G: 375; 3 INJECTION, SOLUTION INTRAVENOUS at 05:43

## 2020-01-14 RX ADMIN — TAZOBACTAM SODIUM AND PIPERACILLIN SODIUM 3.38 G: 375; 3 INJECTION, SOLUTION INTRAVENOUS at 18:22

## 2020-01-14 RX ADMIN — MORPHINE SULFATE 2 MG: 2 INJECTION, SOLUTION INTRAMUSCULAR; INTRAVENOUS at 10:11

## 2020-01-14 RX ADMIN — DOXYCYCLINE HYCLATE 100 MG: 100 CAPSULE ORAL at 08:53

## 2020-01-14 RX ADMIN — TAZOBACTAM SODIUM AND PIPERACILLIN SODIUM 3.38 G: 375; 3 INJECTION, SOLUTION INTRAVENOUS at 13:47

## 2020-01-14 RX ADMIN — MORPHINE SULFATE 2 MG: 2 INJECTION, SOLUTION INTRAMUSCULAR; INTRAVENOUS at 22:09

## 2020-01-14 RX ADMIN — ACETAMINOPHEN 650 MG: 325 TABLET, FILM COATED ORAL at 22:09

## 2020-01-14 RX ADMIN — PROCHLORPERAZINE EDISYLATE 5 MG: 5 INJECTION, SOLUTION INTRAMUSCULAR; INTRAVENOUS at 10:14

## 2020-01-14 RX ADMIN — MORPHINE SULFATE 2 MG: 2 INJECTION, SOLUTION INTRAMUSCULAR; INTRAVENOUS at 16:03

## 2020-01-14 RX ADMIN — TAZOBACTAM SODIUM AND PIPERACILLIN SODIUM 3.38 G: 375; 3 INJECTION, SOLUTION INTRAVENOUS at 01:09

## 2020-01-14 RX ADMIN — ACETAMINOPHEN 650 MG: 325 TABLET, FILM COATED ORAL at 17:46

## 2020-01-14 RX ADMIN — MORPHINE SULFATE 2 MG: 2 INJECTION, SOLUTION INTRAMUSCULAR; INTRAVENOUS at 05:35

## 2020-01-14 RX ADMIN — SODIUM CHLORIDE: 9 INJECTION, SOLUTION INTRAVENOUS at 12:32

## 2020-01-14 RX ADMIN — DOXYCYCLINE HYCLATE 100 MG: 100 CAPSULE ORAL at 22:09

## 2020-01-14 ASSESSMENT — ACTIVITIES OF DAILY LIVING (ADL)
ADLS_ACUITY_SCORE: 10

## 2020-01-14 NOTE — PLAN OF CARE
VSS, A/O. Independent in room. LS-dim, ambulation encouraged. IV zosyn q6h, doxcy PO q12h.  Pt only having ice chips so far, c/o nausea.  Compazine and Morphine 2mg prn.  CRS and OBGYN following.  WBC trending down.  IVF infusing.

## 2020-01-14 NOTE — PROGRESS NOTES
COLON & RECTAL SURGERY  PROGRESS NOTE    January 14, 2020    SUBJECTIVE:  Patient states her abdominal pain was a little better this morning.  She had some nausea with the clear liquid diet last night so has backed off to just ice chips.  She has not vomited.  Passing gas, but no stool.    OBJECTIVE:  Temp:  [95.9  F (35.5  C)-98.4  F (36.9  C)] 96.5  F (35.8  C)  Heart Rate:  [77-87] 80  Resp:  [14-18] 18  BP: (105-135)/(61-81) 117/61  SpO2:  [93 %-98 %] 93 %    Intake/Output Summary (Last 24 hours) at 1/14/2020 0839  Last data filed at 1/14/2020 0752  Gross per 24 hour   Intake 2415 ml   Output 975 ml   Net 1440 ml       GENERAL:  Awake, alert, no acute distress, lying in bed.  HEAD: Nomocephalic atraumatic  SCLERA: anicteric  EXTREMITIES: warm and well perfused  ABDOMEN:  Soft, tender over lower abdomen, non-distended, no rebound or guarding, no peritoneal signs.    LABS:  Lab Results   Component Value Date    WBC 6.2 01/14/2020     Lab Results   Component Value Date    HGB 12.0 01/14/2020     Lab Results   Component Value Date    HCT 37.4 01/14/2020     Lab Results   Component Value Date     01/14/2020     Last Basic Metabolic Panel:  Lab Results   Component Value Date     01/14/2020      Lab Results   Component Value Date    POTASSIUM 3.8 01/14/2020     Lab Results   Component Value Date    CHLORIDE 108 01/14/2020     Lab Results   Component Value Date    SAHARA 8.4 01/14/2020     Lab Results   Component Value Date    CO2 26 01/14/2020     Lab Results   Component Value Date    BUN 9 01/14/2020     Lab Results   Component Value Date    CR 0.83 01/14/2020     Lab Results   Component Value Date    GLC 78 01/14/2020       ASSESSMENT/PLAN: 34 year old woman admitted with abdominal pain. CT scan shows a small fluid collection adjacent to the sigmoid colon as well as adjacent to the uterus.  + trichomonas.  Unclear if symptoms related to GI vs  system.  Afebrile, vital signs stable.  WBC 6.2.     -  Continue ice chips for now, ok from our perspective to advance to clears later today if doing well  - Continue IVF  - Continue IV abx  - Pain management as needed, minimize narcotics as able  - Encourage ambulation  - No plans for surgery at this time from our perspective  - Appreciate GYN input and recommendations      For questions/paging, please contact the CRS office at [phone].    Jodi Jenkins PA-C  [facility]  Phone: [phone]    Colon and Rectal Surgery Attending Note    Patient seen and examined independently.  Agree with above assessment and plan.  Feels a bit better.  Abd tender without rebound or guarding  Plan as above.    Farzana Mishra MD  [facility]  Office Phone # [phone]

## 2020-01-14 NOTE — PROGRESS NOTES
Chart reviewed including course to date. +Trich on admit, treated. Intra-abdominal abscess concerning for GI vs  process. Treated empirically for PID given other positive STD testing but GC/Chlam ultimately returned negative. Admit eval also significant for retained IUD.    Had some increased pain overnight so diet was backed off per primary team. Pain was better this morning, however, and per CRS ok to advance to clears. Patient continues to be AF with stable vitals. Patient continues on IV Abx and WBC continues to be normal.     At this point +/- diagnosis of PID given negative GC/Chlam testing though overall the patient is improving with current treatment regimen. If she continues to improve, would complete course of doxy/flagyl x 14 days total to cover for the possibility of non-gonococcal/CT PID. Patient should then follow as outpatient to discuss IUD removal once antibiotics are completed. If there is concern that patient is not clinically improving or she clinically worsens, this would raise concerns that this is not related to PID given lack of response to appropriate treatment and no other findings suggestive of PID such as a TOA on imaging. Would considering pursuing further diagnostic imaging in that setting.     Gyn will continue to follow peripherally. Please do not hesitate to call with any questions or concerns. The pager for the on call [facility] physician for each day can be found by calling L&D at [phone].    Johanna Bullock MD, FACOG [facility]  1/14/2020 3:10 PM

## 2020-01-14 NOTE — PROGRESS NOTES
Paged Provider at [phone]:  Pt is complaining of sharp abdominal pain and nausea. Zofran has been given and no other antiemetics ordered. Clear liquids have been causing sharp pain and tolerated poorly. Please advise. Thank you!    Provider ordering compazine and noting to return to ice chips only for the night.

## 2020-01-14 NOTE — PLAN OF CARE
Pt up independently. Pain rating 8/10 with partial relief from IV morphine. New IV placed this shift and pt has been tolerating it well. IVF. Zosyn and doxycycline for abx therapy. LS clear, BS hypo, passing flatus. LBM - 1/11/20. No problems with voiding per pt and does not feel the urge to go. Continues to have vaginal discharge. Lower abdominal discomfort/pain - sharp and constant. Ice chips last evening with the hopes to advance to clears and tolerate today.

## 2020-01-14 NOTE — PROGRESS NOTES
[facility]    Hospitalist Progress Note    Date of Service (when I saw the patient): 01/14/2020  Provider:  Rob Machuca MD   Text Page  7am - 6PM       Assessment & Plan   Derrick Mix is a 34 year old female who  has a past medical history of Bipolar 1 disorder (H), Hirsutism, Morbid obesity (H), Tobacco use, and Uncomplicated asthma.  She    was admitted on 1/11/2020 due to lower abdominal pain, leukocytosis and CT findings of a pelvic mass, of significant size and suspicious for an abscess.  Nature of the mass is a still unclear, it might be PID or diverticulitis with abscess formation.  Both colorectal surgeon and OB/GYN has been consulted and they are part of the treatment team.    1.  Inflammatory pelvic mass, possible abscess from perforated diverticulitis versus PID.  - On IV antibiotic Zosyn/doxycycline.  Completed metronidazole treatment for trichomonas.  -We will continue to follow recommendation from colorectal surgeon and OB/GYN.  - Pain controlled with current medication.  - Clear liquid diet per surgical service started yesterday, failed and has been restarted today.  -Case curbsided with Dr. Mishra from colorectal, will continue with antibiotic same as we have been doing.    2.  History of mild intermittent asthma.  Controlled with.  3.  History of bipolar 1 disorder.  Not on any home medication listed on admission.  4.  Obesity.  5.  History of tobacco use.     DVT Prophylaxis: Pneumatic Compression Devices  Code Status: Full Code    Disposition: Expected discharge in 1-2 days once able to transition to oral treatment    Interval History   Dominant symptom continues to be pain in the lower abdomen, below the umbilicus.  Exacerbates with movement. No fever.  She had some nausea yesterday night.  Sed rate 39, CRP 94.7.    -Data reviewed today: I reviewed all new labs and imaging results over the last 24 hours.     Physical Exam   Temp: 96.5  F (35.8  C) Temp src: Oral BP: 117/61    Heart Rate: 80 Resp: 18 SpO2: 93 % O2 Device: None (Room air)    There were no vitals filed for this visit.  Vital Signs with Ranges  Temp:  [95.9  F (35.5  C)-98.4  F (36.9  C)] 96.5  F (35.8  C)  Heart Rate:  [77-87] 80  Resp:  [14-18] 18  BP: (105-135)/(61-81) 117/61  SpO2:  [93 %-98 %] 93 %  I/O last 3 completed shifts:  In: 2415 [P.O.:360; I.V.:2055]  Out: 750 [Urine:750]    GEN:  Alert, oriented x 3, appears comfortable, NAD.  HEENT:  Normocephalic/atraumatic, no scleral icterus, no nasal discharge, mouth moist.  CV:  Regular rate and rhythm, no murmur or JVD.  S1 + S2 noted, no S3 or S4.  LUNGS:  Clear to auscultation bilaterally without rales/rhonchi/wheezing/retractions.  Symmetric chest rise on inhalation noted.  ABD:  Active bowel sounds, very tender to light palpation/non-distended.  No rebound/guarding/rigidity.  EXT:  No edema or cyanosis.  No joint synovitis noted.  SKIN:  Dry to touch, no exanthems noted in the visualized areas.       Medications     sodium chloride 100 mL/hr at 01/14/20 1232       doxycycline hyclate  100 mg Oral Q12H HEMA     piperacillin-tazobactam  3.375 g Intravenous Q6H     sodium chloride (PF)  3 mL Intracatheter Q8H       Data   Recent Labs   Lab 01/14/20  0710 01/13/20  0648 01/12/20  0657   WBC 6.2 8.7 13.2*   HGB 12.0 11.9 12.4   MCV 93 93 92    208 220    139 139   POTASSIUM 3.8 3.5 3.7   CHLORIDE 108 108 110*   CO2 26 23 27   BUN 9 8 8   CR 0.83 0.72 0.77   ANIONGAP 5 8 2*   SAHARA 8.4* 8.3* 8.2*   GLC 78 82 101*       No results found for this or any previous visit (from the past 24 hour(s)).          Disclaimer: This note consists of symbols derived from keyboarding, dictation and/or voice recognition software. As a result, there may be errors in the script that have gone undetected. Please consider this when interpreting information found in this chart.

## 2020-01-14 NOTE — PLAN OF CARE
Pt is A&Ox4, VSS, ambulating independently, and IV infusing. Pt showered this evening. Sharp pain generalized to right side of abdomen and managed with PRN IV morphine. Tender to touch. Bowel sounds active in all four quadrants. Abdomen appears obese and distended. Intermittent nausea managed with IV zofran and compazine. Pt having tea colored urine and voiding spontaneously. Pt tolerating clear liquids poorly- will return to ice chips only for the night. Plan of care reviewed with patient and family.

## 2020-01-14 NOTE — PROGRESS NOTES
Cross cover notified of patient with ongoing nausea and abdominal pain.  Reviewed chart and spoke with patient's RN.  She is here for an inflammatory pelvic mass/abscess.  Perforated diverticulitis versus PID.  Overall looks to be improving on antibiotics.  Has used morphine throughout the day and Zofran.  Trialed clear liquid diets this afternoon and evening, however worsening pain and nausea with this.  Vital signs remained stable.  -Recommend backing off on diet ice chips tonight and retry advancement tomorrow  -Due for her IV morphine  -Added IV Compazine as needed for nausea

## 2020-01-15 LAB
ANION GAP SERPL CALCULATED.3IONS-SCNC: 6 MMOL/L (ref 3–14)
BASOPHILS # BLD AUTO: 0 10E9/L (ref 0–0.2)
BASOPHILS NFR BLD AUTO: 0.6 %
BUN SERPL-MCNC: 7 MG/DL (ref 7–30)
CALCIUM SERPL-MCNC: 8.4 MG/DL (ref 8.5–10.1)
CHLORIDE SERPL-SCNC: 105 MMOL/L (ref 94–109)
CO2 SERPL-SCNC: 27 MMOL/L (ref 20–32)
CREAT SERPL-MCNC: 0.79 MG/DL (ref 0.52–1.04)
CRP SERPL-MCNC: 64.1 MG/L (ref 0–8)
DIFFERENTIAL METHOD BLD: NORMAL
EOSINOPHIL # BLD AUTO: 0.3 10E9/L (ref 0–0.7)
EOSINOPHIL NFR BLD AUTO: 5.7 %
ERYTHROCYTE [DISTWIDTH] IN BLOOD BY AUTOMATED COUNT: 12.4 % (ref 10–15)
GFR SERPL CREATININE-BSD FRML MDRD: >90 ML/MIN/{1.73_M2}
GLUCOSE SERPL-MCNC: 92 MG/DL (ref 70–99)
HCT VFR BLD AUTO: 35.9 % (ref 35–47)
HGB BLD-MCNC: 11.7 G/DL (ref 11.7–15.7)
IMM GRANULOCYTES # BLD: 0 10E9/L (ref 0–0.4)
IMM GRANULOCYTES NFR BLD: 0.4 %
LYMPHOCYTES # BLD AUTO: 1 10E9/L (ref 0.8–5.3)
LYMPHOCYTES NFR BLD AUTO: 19.2 %
MCH RBC QN AUTO: 29.8 PG (ref 26.5–33)
MCHC RBC AUTO-ENTMCNC: 32.6 G/DL (ref 31.5–36.5)
MCV RBC AUTO: 91 FL (ref 78–100)
MONOCYTES # BLD AUTO: 0.4 10E9/L (ref 0–1.3)
MONOCYTES NFR BLD AUTO: 8.3 %
NEUTROPHILS # BLD AUTO: 3.3 10E9/L (ref 1.6–8.3)
NEUTROPHILS NFR BLD AUTO: 65.8 %
NRBC # BLD AUTO: 0 10*3/UL
NRBC BLD AUTO-RTO: 0 /100
PLATELET # BLD AUTO: 248 10E9/L (ref 150–450)
POTASSIUM SERPL-SCNC: 3.7 MMOL/L (ref 3.4–5.3)
RBC # BLD AUTO: 3.93 10E12/L (ref 3.8–5.2)
SODIUM SERPL-SCNC: 138 MMOL/L (ref 133–144)
WBC # BLD AUTO: 5 10E9/L (ref 4–11)

## 2020-01-15 PROCEDURE — 25000132 ZZH RX MED GY IP 250 OP 250 PS 637: Performed by: INTERNAL MEDICINE

## 2020-01-15 PROCEDURE — 99232 SBSQ HOSP IP/OBS MODERATE 35: CPT | Performed by: HOSPITALIST

## 2020-01-15 PROCEDURE — 80048 BASIC METABOLIC PNL TOTAL CA: CPT | Performed by: HOSPITALIST

## 2020-01-15 PROCEDURE — 25000132 ZZH RX MED GY IP 250 OP 250 PS 637: Performed by: OBSTETRICS & GYNECOLOGY

## 2020-01-15 PROCEDURE — 25000128 H RX IP 250 OP 636: Performed by: INTERNAL MEDICINE

## 2020-01-15 PROCEDURE — 25000128 H RX IP 250 OP 636: Performed by: HOSPITALIST

## 2020-01-15 PROCEDURE — 36415 COLL VENOUS BLD VENIPUNCTURE: CPT | Performed by: HOSPITALIST

## 2020-01-15 PROCEDURE — 25800030 ZZH RX IP 258 OP 636: Performed by: INTERNAL MEDICINE

## 2020-01-15 PROCEDURE — 85025 COMPLETE CBC W/AUTO DIFF WBC: CPT | Performed by: HOSPITALIST

## 2020-01-15 PROCEDURE — 12000000 ZZH R&B MED SURG/OB

## 2020-01-15 PROCEDURE — 86140 C-REACTIVE PROTEIN: CPT | Performed by: HOSPITALIST

## 2020-01-15 PROCEDURE — 25000132 ZZH RX MED GY IP 250 OP 250 PS 637: Performed by: COLON & RECTAL SURGERY

## 2020-01-15 PROCEDURE — 99207 ZZC CDG-MDM COMPONENT: MEETS LOW - DOWN CODED: CPT | Performed by: HOSPITALIST

## 2020-01-15 RX ORDER — OXYCODONE HYDROCHLORIDE 5 MG/1
5 TABLET ORAL EVERY 6 HOURS PRN
Status: DISCONTINUED | OUTPATIENT
Start: 2020-01-15 | End: 2020-01-16 | Stop reason: HOSPADM

## 2020-01-15 RX ADMIN — OXYCODONE HYDROCHLORIDE 5 MG: 5 TABLET ORAL at 23:55

## 2020-01-15 RX ADMIN — TAZOBACTAM SODIUM AND PIPERACILLIN SODIUM 3.38 G: 375; 3 INJECTION, SOLUTION INTRAVENOUS at 18:22

## 2020-01-15 RX ADMIN — TAZOBACTAM SODIUM AND PIPERACILLIN SODIUM 3.38 G: 375; 3 INJECTION, SOLUTION INTRAVENOUS at 23:55

## 2020-01-15 RX ADMIN — TAZOBACTAM SODIUM AND PIPERACILLIN SODIUM 3.38 G: 375; 3 INJECTION, SOLUTION INTRAVENOUS at 00:06

## 2020-01-15 RX ADMIN — ACETAMINOPHEN 650 MG: 325 TABLET, FILM COATED ORAL at 15:37

## 2020-01-15 RX ADMIN — TAZOBACTAM SODIUM AND PIPERACILLIN SODIUM 3.38 G: 375; 3 INJECTION, SOLUTION INTRAVENOUS at 12:22

## 2020-01-15 RX ADMIN — DOXYCYCLINE HYCLATE 100 MG: 100 CAPSULE ORAL at 20:52

## 2020-01-15 RX ADMIN — OXYCODONE HYDROCHLORIDE 5 MG: 5 TABLET ORAL at 18:19

## 2020-01-15 RX ADMIN — ACETAMINOPHEN 650 MG: 325 TABLET, FILM COATED ORAL at 06:33

## 2020-01-15 RX ADMIN — SODIUM CHLORIDE: 9 INJECTION, SOLUTION INTRAVENOUS at 15:32

## 2020-01-15 RX ADMIN — ONDANSETRON 4 MG: 4 TABLET, ORALLY DISINTEGRATING ORAL at 06:02

## 2020-01-15 RX ADMIN — ONDANSETRON 4 MG: 4 TABLET, ORALLY DISINTEGRATING ORAL at 18:18

## 2020-01-15 RX ADMIN — OXYCODONE HYDROCHLORIDE 5 MG: 5 TABLET ORAL at 12:29

## 2020-01-15 RX ADMIN — ACETAMINOPHEN 650 MG: 325 TABLET, FILM COATED ORAL at 23:55

## 2020-01-15 RX ADMIN — MORPHINE SULFATE 2 MG: 2 INJECTION, SOLUTION INTRAMUSCULAR; INTRAVENOUS at 06:33

## 2020-01-15 RX ADMIN — DOXYCYCLINE HYCLATE 100 MG: 100 CAPSULE ORAL at 08:08

## 2020-01-15 RX ADMIN — TAZOBACTAM SODIUM AND PIPERACILLIN SODIUM 3.38 G: 375; 3 INJECTION, SOLUTION INTRAVENOUS at 06:02

## 2020-01-15 RX ADMIN — ONDANSETRON 4 MG: 4 TABLET, ORALLY DISINTEGRATING ORAL at 12:29

## 2020-01-15 ASSESSMENT — ACTIVITIES OF DAILY LIVING (ADL)
ADLS_ACUITY_SCORE: 10

## 2020-01-15 NOTE — PLAN OF CARE
Pt sleeping much of night after guests left, reports pain managed well with tylenol and morphine x1, encouraged ambulation for gas discomfort, VSS, continues zosyn and vibramycin, independent with mobility, PIV with NS 100ml/hr, tolerating clear liquids without discomfort, zofran x1 for expected nausea related to morphine.

## 2020-01-15 NOTE — PROGRESS NOTES
COLON & RECTAL SURGERY  PROGRESS NOTE    January 15, 2020    SUBJECTIVE:  Patient reports her pain was better, but is now worse again after passing stool early this morning.  She states the stool was formed and had no blood.  She is on clear liquid diet and does have some nausea currently.  She is trying to walk the halls often.      OBJECTIVE:  Temp:  [97.9  F (36.6  C)-99.5  F (37.5  C)] 97.9  F (36.6  C)  Heart Rate:  [68-95] 68  Resp:  [18-20] 18  BP: (117-130)/(69-73) 125/69  SpO2:  [94 %-96 %] 96 %    Intake/Output Summary (Last 24 hours) at 1/15/2020 0825  Last data filed at 1/15/2020 0603  Gross per 24 hour   Intake 1640 ml   Output 550 ml   Net 1090 ml       GENERAL:  Awake, alert, no acute distress, sitting up in chair.  HEAD: Nomocephalic atraumatic  SCLERA: anicteric  EXTREMITIES: warm and well perfused  ABDOMEN:  Soft, mildly tender over upper right and mid lower abdomen, non-distended, no rebound or guarding, no peritoneal signs.    LABS:  Lab Results   Component Value Date    WBC 5.0 01/15/2020     Lab Results   Component Value Date    HGB 11.7 01/15/2020     Lab Results   Component Value Date    HCT 35.9 01/15/2020     Lab Results   Component Value Date     01/15/2020     Last Basic Metabolic Panel:  Lab Results   Component Value Date     01/15/2020      Lab Results   Component Value Date    POTASSIUM 3.7 01/15/2020     Lab Results   Component Value Date    CHLORIDE 105 01/15/2020     Lab Results   Component Value Date    SAHARA 8.4 01/15/2020     Lab Results   Component Value Date    CO2 27 01/15/2020     Lab Results   Component Value Date    BUN 7 01/15/2020     Lab Results   Component Value Date    CR 0.79 01/15/2020     Lab Results   Component Value Date    GLC 92 01/15/2020       ASSESSMENT/PLAN:  34 year old woman admitted with abdominal pain. CT scan shows a small fluid collection adjacent to the sigmoid colon as well as adjacent to the uterus.  + trichomonas, treated.  Unclear if  symptoms related to GI vs  system.  Afebrile, vital signs stable.  WBC 5.     - Continue with clears for now, possibly advance to full liquids later today if doing well  - Continue IVF  - Continue IV abx  - Pain management as needed, minimize narcotics as able  - Encourage ambulation  - No plans for surgery at this time from our perspective  - Appreciate GYN input and recommendations      For questions/paging, please contact the CRS office at [phone].    Jodi Jenkins PA-C  [facility]  Phone: [phone]    Colon and Rectal Surgery Attending Note    Patient seen and examined independently.  Agree with above assessment and plan.  Slowly improving.  abd soft with mild tenderness  Plan as above.   Fulls. Oral pain meds.  No plan for surgery for colon and rectal surgery point of view.    Farzana Mishra MD  [facility]  Office Phone # [phone]

## 2020-01-15 NOTE — PLAN OF CARE
Pt up in room and halls -- co  left abd pain after drinking clear liquids---medicated as requested with MS 2mg and tylenol ----continue to encourage

## 2020-01-15 NOTE — PROGRESS NOTES
[facility]    Hospitalist Progress Note    Date of Service (when I saw the patient): 01/15/2020  Provider:  Rbo Machuca MD   Text Page  7am - 6PM       Assessment & Plan   Derrick Mix is a 34 year old female who  has a past medical history of Bipolar 1 disorder (H), Hirsutism, Morbid obesity (H), Tobacco use, and Uncomplicated asthma.  She    was admitted on 1/11/2020 due to lower abdominal pain, leukocytosis and CT findings of a pelvic mass, of significant size and suspicious for an abscess.  Nature of the mass is a still unclear, it might be PID or diverticulitis with abscess formation.  Both colorectal surgeon and OB/GYN has been consulted and they are part of the treatment team.    1.  Inflammatory pelvic mass, possible abscess from perforated diverticulitis versus PID.  - On IV antibiotic Zosyn/doxycycline.  Completed metronidazole treatment for trichomonas.  -We will continue to follow recommendation from colorectal surgeon and OB/GYN.  - Pain controlled with current medication.  - Clear liquid diet per surgical service started yesterday, failed and has been restarted today.  -Case curbsided with Dr. Mishra from colorectal, will continue with antibiotic same as we have been doing.    2.  History of mild intermittent asthma.  Controlled with.  3.  History of bipolar 1 disorder.  Not on any home medication listed on admission.  4.  Obesity.  5.  History of tobacco use.     DVT Prophylaxis: Pneumatic Compression Devices  Code Status: Full Code    Disposition: Expected discharge in 1-2 days once complete transition to oral treatment    Interval History   Improving very still having recurrent abdominal pain, below the umbilicus.  Normal bowel movement in the last 4 hours.  Progressing with diet but having difficulties with nausea. No fever.  Not ready for transition to oral medications yet.  [birth year] birthday.  Sed rate 39, CRP 64.1 (94.7).    -Data reviewed today: I reviewed all new labs  and imaging results over the last 24 hours.     Physical Exam   Temp: 97.9  F (36.6  C) Temp src: Oral BP: 125/69   Heart Rate: 68 Resp: 18 SpO2: 96 % O2 Device: None (Room air)    There were no vitals filed for this visit.  Vital Signs with Ranges  Temp:  [97.9  F (36.6  C)-99.5  F (37.5  C)] 97.9  F (36.6  C)  Heart Rate:  [68-95] 68  Resp:  [18-20] 18  BP: (117-130)/(69-73) 125/69  SpO2:  [94 %-96 %] 96 %  I/O last 3 completed shifts:  In: 1640 [P.O.:700; I.V.:940]  Out: 250 [Urine:250]    GEN:  Alert, oriented x 3, appears comfortable, NAD.  HEENT:  Normocephalic/atraumatic, no scleral icterus, no nasal discharge, mouth moist.  CV:  Regular rate and rhythm, no murmur or JVD.  S1 + S2 noted, no S3 or S4.  LUNGS:  Clear to auscultation bilaterally without rales/rhonchi/wheezing/retractions.  Symmetric chest rise on inhalation noted.  ABD:  Active bowel sounds, very tender to light palpation/non-distended.  No rebound/guarding/rigidity.  EXT:  No edema or cyanosis.  No joint synovitis noted.  SKIN:  Dry to touch, no exanthems noted in the visualized areas.       Medications     sodium chloride 100 mL/hr at 01/14/20 1232       doxycycline hyclate  100 mg Oral Q12H HEMA     piperacillin-tazobactam  3.375 g Intravenous Q6H     sodium chloride (PF)  3 mL Intracatheter Q8H       Data   Recent Labs   Lab 01/15/20  0723 01/14/20  0710 01/13/20  0648   WBC 5.0 6.2 8.7   HGB 11.7 12.0 11.9   MCV 91 93 93    226 208    139 139   POTASSIUM 3.7 3.8 3.5   CHLORIDE 105 108 108   CO2 27 26 23   BUN 7 9 8   CR 0.79 0.83 0.72   ANIONGAP 6 5 8   SAHARA 8.4* 8.4* 8.3*   GLC 92 78 82       No results found for this or any previous visit (from the past 24 hour(s)).          Disclaimer: This note consists of symbols derived from keyboarding, dictation and/or voice recognition software. As a result, there may be errors in the script that have gone undetected. Please consider this when interpreting information found in this  chart.

## 2020-01-15 NOTE — CONSULTS
Discharge Planner   Discharge Plans in progress: Yes. Met with patient as no PCP is listed in patient chart. Patient already had resources at bedside. Per chart review patient received resources from care coordination on 1/13.  Informed patient that typically follow up is recommended 1-2 weeks after discharge from the hospital.   Barriers to discharge plan: None anticipated.   Follow up plan: CM available for any further discharge needs.        Entered by: Farzana Aggarwal 01/15/2020 2:31 PM       Farzana Aggarwal MA/RN Case Manager  Inpatient Care Coordination  [facility]   [phone]

## 2020-01-16 VITALS
SYSTOLIC BLOOD PRESSURE: 116 MMHG | TEMPERATURE: 97.2 F | HEART RATE: 64 BPM | OXYGEN SATURATION: 96 % | WEIGHT: 262.1 LBS | BODY MASS INDEX: 46.43 KG/M2 | RESPIRATION RATE: 16 BRPM | DIASTOLIC BLOOD PRESSURE: 73 MMHG

## 2020-01-16 LAB — CRP SERPL-MCNC: 37 MG/L (ref 0–8)

## 2020-01-16 PROCEDURE — 86140 C-REACTIVE PROTEIN: CPT | Performed by: HOSPITALIST

## 2020-01-16 PROCEDURE — 25000128 H RX IP 250 OP 636: Performed by: INTERNAL MEDICINE

## 2020-01-16 PROCEDURE — 25000132 ZZH RX MED GY IP 250 OP 250 PS 637: Performed by: PHYSICIAN ASSISTANT

## 2020-01-16 PROCEDURE — 25000132 ZZH RX MED GY IP 250 OP 250 PS 637: Performed by: OBSTETRICS & GYNECOLOGY

## 2020-01-16 PROCEDURE — 25000132 ZZH RX MED GY IP 250 OP 250 PS 637: Performed by: INTERNAL MEDICINE

## 2020-01-16 PROCEDURE — 25000132 ZZH RX MED GY IP 250 OP 250 PS 637: Performed by: COLON & RECTAL SURGERY

## 2020-01-16 PROCEDURE — 36415 COLL VENOUS BLD VENIPUNCTURE: CPT | Performed by: HOSPITALIST

## 2020-01-16 RX ORDER — OXYCODONE HYDROCHLORIDE 5 MG/1
5 TABLET ORAL EVERY 6 HOURS PRN
Qty: 10 TABLET | Refills: 0 | Status: SHIPPED | OUTPATIENT
Start: 2020-01-16 | End: 2020-01-29

## 2020-01-16 RX ORDER — CIPROFLOXACIN 500 MG/1
500 TABLET, FILM COATED ORAL EVERY 12 HOURS
Qty: 20 TABLET | Refills: 0 | Status: SHIPPED | OUTPATIENT
Start: 2020-01-16 | End: 2020-01-29

## 2020-01-16 RX ORDER — DOXYCYCLINE 100 MG/1
100 CAPSULE ORAL EVERY 12 HOURS
Qty: 20 CAPSULE | Refills: 0 | Status: SHIPPED | OUTPATIENT
Start: 2020-01-16 | End: 2020-01-29

## 2020-01-16 RX ORDER — CIPROFLOXACIN 500 MG/1
500 TABLET, FILM COATED ORAL EVERY 12 HOURS SCHEDULED
Status: DISCONTINUED | OUTPATIENT
Start: 2020-01-16 | End: 2020-01-16 | Stop reason: HOSPADM

## 2020-01-16 RX ORDER — METRONIDAZOLE 500 MG/1
500 TABLET ORAL 3 TIMES DAILY
Status: DISCONTINUED | OUTPATIENT
Start: 2020-01-16 | End: 2020-01-16 | Stop reason: HOSPADM

## 2020-01-16 RX ORDER — METRONIDAZOLE 500 MG/1
500 TABLET ORAL 3 TIMES DAILY
Qty: 30 TABLET | Refills: 0 | Status: SHIPPED | OUTPATIENT
Start: 2020-01-16 | End: 2020-01-29

## 2020-01-16 RX ADMIN — ACETAMINOPHEN 650 MG: 325 TABLET, FILM COATED ORAL at 08:37

## 2020-01-16 RX ADMIN — DOXYCYCLINE HYCLATE 100 MG: 100 CAPSULE ORAL at 08:37

## 2020-01-16 RX ADMIN — TAZOBACTAM SODIUM AND PIPERACILLIN SODIUM 3.38 G: 375; 3 INJECTION, SOLUTION INTRAVENOUS at 06:25

## 2020-01-16 RX ADMIN — CIPROFLOXACIN HYDROCHLORIDE 500 MG: 500 TABLET, FILM COATED ORAL at 12:49

## 2020-01-16 RX ADMIN — OXYCODONE HYDROCHLORIDE 5 MG: 5 TABLET ORAL at 08:37

## 2020-01-16 ASSESSMENT — ACTIVITIES OF DAILY LIVING (ADL)
ADLS_ACUITY_SCORE: 10

## 2020-01-16 NOTE — PLAN OF CARE
Up in room and halls ---  states feeling  a little better --- medicated for pain and nausea as requested----  tolerating liquids better ---  resting comfortable

## 2020-01-16 NOTE — PLAN OF CARE
Pt sleeping well overnight after tylenol and oxy at HS for pain to abd, continues IVF via PIV and zosyn/vibramycin for abx, lot of visitors present during evening, ambulating independently, tolerating full liquids, states she is feeling better.

## 2020-01-16 NOTE — PROGRESS NOTES
COLON [facility] RECTAL [facility]GERY  PROGRESS NOTE    January 16, 2020    SUBJECTIVE:  Patient reports she is feeling better.  She is tolerating full liquids.  She has intermittent pain, controlled with meds.  She is up walking the halls throughout the day.  Last stool was yesterday.    OBJECTIVE:  Temp:  [97.2  F (36.2  C)-97.7  F (36.5  C)] 97.2  F (36.2  C)  Pulse:  [64] 64  Heart Rate:  [66-72] 72  Resp:  [16] 16  BP: (116-130)/(59-74) 116/73  SpO2:  [95 %-97 %] 96 %    Intake/Output Summary (Last 24 hours) at 1/16/2020 0822  Last data filed at 1/16/2020 0625  Gross per 24 hour   Intake 1110 ml   Output --   Net 1110 ml       GENERAL:  Awake, alert, no acute distress, lying in bed.  HEAD: Nomocephalic atraumatic  SCLERA: anicteric  EXTREMITIES: warm and well perfused  ABDOMEN:  Soft, mild tenderness over LLQ, non-distended, no rebound or guarding, no peritoneal signs.      LABS:  Lab Results   Component Value Date    WBC 5.0 01/15/2020     Lab Results   Component Value Date    HGB 11.7 01/15/2020     Lab Results   Component Value Date    HCT 35.9 01/15/2020     Lab Results   Component Value Date     01/15/2020     Last Basic Metabolic Panel:  Lab Results   Component Value Date     01/15/2020      Lab Results   Component Value Date    POTASSIUM 3.7 01/15/2020     Lab Results   Component Value Date    CHLORIDE 105 01/15/2020     Lab Results   Component Value Date    SAHARA 8.4 01/15/2020     Lab Results   Component Value Date    CO2 27 01/15/2020     Lab Results   Component Value Date    BUN 7 01/15/2020     Lab Results   Component Value Date    CR 0.79 01/15/2020     Lab Results   Component Value Date    GLC 92 01/15/2020       ASSESSMENT/PLAN: 35 year old woman admitted with abdominal pain. CT scan shows a small fluid collection adjacent to the sigmoid colon as well as adjacent to the uterus.  + trichomonas, treated.  Unclear if symptoms related to GI vs  system.  Afebrile, vital signs stable.     - Ok for low  fiber diet. Encouraged her to go slow.  - Continue IV abx  - Pain management as needed, minimize narcotics as able  - Encourage ambulation  - No plans for surgery at this time from our perspective      For questions/paging, please contact the CRS office at [phone].    Jodi Jenkins PA-C  [facility]  Phone: [phone]

## 2020-01-16 NOTE — PLAN OF CARE
Pt to D/C to home.  Pt provided with d/c instructions, including new medications, when medications were last given, and when to take them again.  Pt also informed to f/u with primary in 7 days.  Pt verbalized understanding of all d/c and f/u instructions.  All questions were answered at this time.  Copy of paperwork sent with pt.  Medications sent with pt.  Family to provide transport.  All personal belongings sent with pt. No concerns at this time.

## 2020-01-17 PROCEDURE — 99238 HOSP IP/OBS DSCHRG MGMT 30/<: CPT | Performed by: INTERNAL MEDICINE

## 2020-01-17 NOTE — DISCHARGE SUMMARY
[facility]    Discharge Summary  Hospitalist    Date of Admission:  1/11/2020  Date of Discharge:  1/16/2020  2:36 PM  Discharging Provider: Ekaterina Rodriguez MD  Date of Service (when I saw the patient): 01/17/20    Discharge Diagnoses   1.  Inflammatory pelvic mass, possible abscess from perforated diverticulitis versus PID.  2.  History of mild intermittent asthma.   3.  History of bipolar 1 disorder.  Not on any home medication listed on admission.  4.  Obesity.  5.  History of tobacco use.     History of Present Illness   Derrick Mix is an 35 year old female who presented with     Hospital Course   Derrick Mix is a 34 year old female who  has a past medical history of Bipolar 1 disorder (H), Hirsutism, Morbid obesity (H), Tobacco use, and Uncomplicated asthma.  She    was admitted on 1/11/2020 due to lower abdominal pain, leukocytosis and CT findings of a pelvic mass, of significant size and suspicious for an abscess.  Nature of the mass is a still unclear, it might be PID or diverticulitis with abscess formation.  Both colorectal surgeon and OB/GYN has been consulted and they are part of the treatment team.     1.  Inflammatory pelvic mass, possible abscess from perforated diverticulitis versus PID.  - She was treated with IV Zosyn/doxycycline hospital course.  She was also treated with metronidazole.  Patient was followed by colorectal surgeon and OB/GYN during her hospital course.  Ear pain improved.  She was started on clear liquid diet and a diet advanced as tolerated.  Colorectal surgery and OB/GYN recommended to continue oral antibiotics on discharge for 10 more days.  Orders were placed for ciprofloxacin, doxycycline and metronidazole on discharge.  She was advised to follow-up with surgery and OB/GYN as outpatient.     2.  History of mild intermittent asthma.  Controlled with inhalers.  3.  History of bipolar 1 disorder.  Not on any home medication listed on admission.  4.   Obesity.  5.  History of tobacco use.    She remained stable during hospital course.  She was discharged home in a stable condition    Significant Results and Procedures   Results for orders placed or performed during the hospital encounter of 01/11/20   CT Abdomen Pelvis w Contrast    Narrative    EXAM: CT ABDOMEN PELVIS W CONTRAST  LOCATION: [facility]  DATE/TIME: 1/12/2020 12:15 AM    INDICATION: Right lower quadrant abdominal pain.  COMPARISON: None.  TECHNIQUE: CT scan of the abdomen and pelvis was performed following injection of IV contrast. Multiplanar reformats were obtained. Dose reduction techniques were used.  CONTRAST: 100 mL Isovue-370.    FINDINGS:   LOWER CHEST: Normal.    HEPATOBILIARY: Fatty liver.    PANCREAS: Normal.    SPLEEN: Normal.    ADRENAL GLANDS: Normal.    KIDNEYS/BLADDER: Normal.    BOWEL: There is a large amount of inflammatory change involving the lower abdomen extending into the pelvis. This appears to be centered upon the upper sigmoid colon where there is localized wall thickening. There is a small complex fluid collection   along the anterior aspect of the uterus with early peripheral enhancement measuring 4.2 x 3.1 x 1.1 cm suspicious for abscess. Appendix is normal. No appendicitis. There is reactive wall thickening in the adjacent distal ileum.    LYMPH NODES: Normal.    VASCULATURE: Unremarkable.    PELVIC ORGANS: Malpositioned low-lying intrauterine device with the stem within the cervix and probable embedment of the right arm within the myometrium.    OTHER: None.    MUSCULOSKELETAL: Normal.      Impression    IMPRESSION:   1.  Large amount of inflammation within the lower abdomen extending into the pelvis. This appears to be centered upon the upper sigmoid colon where there is localized low-attenuation wall thickening. Reactive thickening in the adjacent distal ileum.   There is a 4.2 x 3.1 x 1.1 cm complex fluid collection along the anterior aspect of the  uterus suspicious for developing abscess with some complex fluid also seen in the pelvic cul-de-sac. It is unclear whether this is due to a sigmoid colonic source   inflammation or could be due to pelvic inflammatory disease.    2.  No appendicitis.    3.  Malpositioned low-lying IUD with the stem within the lower uterus and cervix with probable embedment of the right arm within the myometrium.     US Pelvic Complete with Transvaginal    Narrative    EXAM: US PELVIC COMPLETE WITH TRANSVAGINAL  LOCATION: [facility]  DATE/TIME: 1/12/2020 2:10 AM    INDICATION: Pelvic pain, possible abscess  COMPARISON: CT dated 01/12/2020  TECHNIQUE: Transabdominal scans were performed. Endovaginal ultrasound was performed to better visualize the adnexa. Color flow with spectral Doppler and waveform analysis performed.    FINDINGS:    UTERUS: 9.4 x 4.8 x 6.2 cm. Normal in size and position with no masses. A 3.6 x 1.5 x 3.4 cm complex collection is seen along the anterior uterine fundus, corresponding to fluid collection seen on CT performed earlier same day.    ENDOMETRIUM: 4 mm. IUD in oblique position within the uterus.    RIGHT OVARY: 4.6 x 2.7 x 2 point cm. Normal with arterial and venous duplex flow identified.    LEFT OVARY: 5.3 x 3.4 x 3.1 cm. Normal with arterial and venous duplex flow identified.    No significant free fluid.      Impression    IMPRESSION:    1.  3.6 x 1.5 x 3.4 cm fluid collection anterior to the uterine fundus corresponding to fluid collection seen on CT, may reflect abscess in the proper clinical context.  2.  IUD.  3.  No ovarian torsion.                 Pending Results   None  Code Status   Full Code       Primary Care Physician   Physician No Ref-Primary        Discharge Disposition   Discharged to home  Condition at discharge: Stable    Consultations This Hospital Stay   COLORECTAL SURGERY IP CONSULT  GYNECOLOGY IP CONSULT  CARE COORDINATOR IP CONSULT  CARE COORDINATOR IP CONSULT    Time  Spent on this Encounter   I, Ekaterina Rodriguez MD, MD, personally saw the patient today and spent greater than 30 minutes discharging this patient.    Discharge Orders      Reason for your hospital stay    Inflammatory pelvic mass, possible abscess from perforated diverticulitis versus PID.     Activity    Your activity upon discharge: activity as tolerated     Follow-up and recommended labs and tests     Follow up with primary care provider, Physician No Ref-Primary, within 7 days  Follow-up with colorectal surgery and OB/GYN as scheduled     Diet    Follow this diet upon discharge: Orders Placed This Encounter      Low Fiber Diet     Discharge Medications   Discharge Medication List as of 1/16/2020  1:09 PM      START taking these medications    Details   ciprofloxacin (CIPRO) 500 MG tablet Take 1 tablet (500 mg) by mouth every 12 hours for 10 days, Disp-20 tablet, R-0, E-Prescribe      doxycycline hyclate (VIBRAMYCIN) 100 MG capsule Take 1 capsule (100 mg) by mouth every 12 hours for 10 days, Disp-20 capsule, R-0, E-Prescribe      metroNIDAZOLE (FLAGYL) 500 MG tablet Take 1 tablet (500 mg) by mouth 3 times daily for 10 days, Disp-30 tablet, R-0, E-Prescribe      oxyCODONE (ROXICODONE) 5 MG tablet Take 1 tablet (5 mg) by mouth every 6 hours as needed for moderate to severe pain, Disp-10 tablet, R-0, Local Print             Allergies   Allergies   Allergen Reactions     Latex      Paxil [Paroxetine Mesylate]      Zoloft      Data   Most Recent 3 CBC's:  Recent Labs   Lab Test 01/15/20  0723 01/14/20  0710 01/13/20  0648   WBC 5.0 6.2 8.7   HGB 11.7 12.0 11.9   MCV 91 93 93    226 208      Most Recent 3 BMP's:  Recent Labs   Lab Test 01/15/20  0723 01/14/20  0710 01/13/20  0648    139 139   POTASSIUM 3.7 3.8 3.5   CHLORIDE 105 108 108   CO2 27 26 23   BUN 7 9 8   CR 0.79 0.83 0.72   ANIONGAP 6 5 8   SAHARA 8.4* 8.4* 8.3*   GLC 92 78 82     Most Recent 2 LFT's:  Recent Labs   Lab Test 09/18/14  1237    AST 39   ALT 41   ALKPHOS 66   BILITOTAL 0.4     Most Recent INR's and Anticoagulation Dosing History:  Anticoagulation Dose History     There is no flowsheet data to display.        Most Recent 3 Troponin's:No lab results found.  Most Recent Cholesterol Panel:No lab results found.  Most Recent 6 Bacteria Isolates From Any Culture (See EPIC Reports for Culture Details):  Recent Labs   Lab Test 09/04/15  1224 09/18/14  1230   CULT Moderate growth Beta hemolytic Streptococcus, not group A* No growth     Most Recent TSH, T4 and A1c Labs:No lab results found.

## 2020-01-18 ENCOUNTER — NURSE TRIAGE (OUTPATIENT)
Dept: NURSING | Facility: CLINIC | Age: 35
End: 2020-01-18

## 2020-01-18 NOTE — TELEPHONE ENCOUNTER
"Pt has not been seen at an [facility] clinic for years. She was recently hospitalized for diverticulitis and discharged on antibiotics. She has been vomiting the antibiotics up and is requesting Zofran. This helped her to keep the meds down while hospitalized. Since she doesn't have an established provider anywhere, she will need to be seen at  or ED this am. She can try [facility] now as there is no charge if they can't help her. Refused further RN triage as she has no other new or worsening symptoms.     Lillie Holcomb RN  [facility]  Triage Nurse Advisors      Reason for Disposition    Caller has URGENT medication question about med that PCP prescribed and triager unable to answer question    Additional Information    Negative: Drug overdose and nurse unable to answer question    Negative: Caller requesting information not related to medicine    Negative: Caller requesting a prescription for Strep throat and has a positive culture result    Negative: Rash while taking a medication or within 3 days of stopping it    Negative: Immunization reaction suspected    Negative: [1] Asthma and [2] having symptoms of asthma (cough, wheezing, etc)    Negative: MORE THAN A DOUBLE DOSE of a prescription or over-the-counter (OTC) drug    Negative: [1] DOUBLE DOSE (an extra dose or lesser amount) of over-the-counter (OTC) drug AND [2] any symptoms (e.g., dizziness, nausea, pain, sleepiness)    Negative: [1] DOUBLE DOSE (an extra dose or lesser amount) of prescription drug AND [2] any symptoms (e.g., dizziness, nausea, pain, sleepiness)    Negative: Took another person's prescription drug    Negative: [1] DOUBLE DOSE (an extra dose or lesser amount) of prescription drug AND [2] NO symptoms (Exception: a double dose of antibiotics)    Negative: Diabetes drug error or overdose (e.g., insulin or extra dose)    Negative: [1] Request for URGENT new prescription or refill of \"essential\" medication (i.e., likelihood of harm " to patient if not taken) AND [2] triager unable to fill per unit policy    Negative: [1] Prescription not at pharmacy AND [2] was prescribed today by PCP    Negative: Pharmacy calling with prescription questions and triager unable to answer question    Protocols used: MEDICATION QUESTION CALL-A-AH

## 2020-01-29 ENCOUNTER — OFFICE VISIT (OUTPATIENT)
Dept: INTERNAL MEDICINE | Facility: CLINIC | Age: 35
End: 2020-01-29
Payer: COMMERCIAL

## 2020-01-29 VITALS
TEMPERATURE: 97.5 F | WEIGHT: 258 LBS | DIASTOLIC BLOOD PRESSURE: 76 MMHG | RESPIRATION RATE: 20 BRPM | HEART RATE: 89 BPM | SYSTOLIC BLOOD PRESSURE: 122 MMHG | BODY MASS INDEX: 45.71 KG/M2 | HEIGHT: 63 IN | OXYGEN SATURATION: 98 %

## 2020-01-29 DIAGNOSIS — K57.32 DIVERTICULITIS OF COLON: ICD-10-CM

## 2020-01-29 DIAGNOSIS — F31.9 BIPOLAR I DISORDER (H): ICD-10-CM

## 2020-01-29 DIAGNOSIS — J45.20 MILD INTERMITTENT ASTHMA WITHOUT COMPLICATION: ICD-10-CM

## 2020-01-29 DIAGNOSIS — R19.7 DIARRHEA, UNSPECIFIED TYPE: ICD-10-CM

## 2020-01-29 DIAGNOSIS — R11.2 NAUSEA AND VOMITING, INTRACTABILITY OF VOMITING NOT SPECIFIED, UNSPECIFIED VOMITING TYPE: ICD-10-CM

## 2020-01-29 DIAGNOSIS — E66.01 MORBID OBESITY (H): ICD-10-CM

## 2020-01-29 DIAGNOSIS — B37.2 YEAST INFECTION OF THE SKIN: ICD-10-CM

## 2020-01-29 DIAGNOSIS — R19.00 PELVIC MASS: Primary | ICD-10-CM

## 2020-01-29 DIAGNOSIS — Z87.891 PERSONAL HISTORY OF TOBACCO USE, PRESENTING HAZARDS TO HEALTH: ICD-10-CM

## 2020-01-29 PROCEDURE — 84439 ASSAY OF FREE THYROXINE: CPT | Performed by: NURSE PRACTITIONER

## 2020-01-29 PROCEDURE — 99203 OFFICE O/P NEW LOW 30 MIN: CPT | Performed by: NURSE PRACTITIONER

## 2020-01-29 PROCEDURE — 36415 COLL VENOUS BLD VENIPUNCTURE: CPT | Performed by: NURSE PRACTITIONER

## 2020-01-29 PROCEDURE — 80048 BASIC METABOLIC PNL TOTAL CA: CPT | Performed by: NURSE PRACTITIONER

## 2020-01-29 PROCEDURE — 84443 ASSAY THYROID STIM HORMONE: CPT | Performed by: NURSE PRACTITIONER

## 2020-01-29 RX ORDER — FLUCONAZOLE 150 MG/1
150 TABLET ORAL DAILY
Qty: 3 TABLET | Refills: 0 | Status: SHIPPED | OUTPATIENT
Start: 2020-01-29 | End: 2020-02-01

## 2020-01-29 RX ORDER — ONDANSETRON 4 MG/1
4 TABLET, ORALLY DISINTEGRATING ORAL EVERY 8 HOURS PRN
Qty: 20 TABLET | Refills: 3 | Status: SHIPPED | OUTPATIENT
Start: 2020-01-29 | End: 2021-02-05

## 2020-01-29 ASSESSMENT — MIFFLIN-ST. JEOR: SCORE: 1829.41
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
## 2020-01-29 NOTE — PATIENT INSTRUCTIONS
Lab in [address]  Stool test       Diflucan every 3 days for 3 doses     BALNEOL lotion to rectal area as needed for pain      Priyanka - counselor- make appointment     [facility]: [facility] [place] [phone]   [email or web address]  Dietician

## 2020-01-29 NOTE — NURSING NOTE
"Chief Complaint   Patient presents with     Hospital F/U     also establish care     initial /76   Pulse 89   Temp 97.5  F (36.4  C) (Oral)   Resp 20   Ht 1.6 m (5' 3\")   Wt 117 kg (258 lb)   LMP 01/06/2020 (Approximate)   SpO2 98%   Breastfeeding No   BMI 45.70 kg/m   Estimated body mass index is 45.7 kg/m  as calculated from the following:    Height as of this encounter: 1.6 m (5' 3\").    Weight as of this encounter: 117 kg (258 lb)..  bp completed using cuff size large  BOBO GOMEZ LPN  "

## 2020-01-30 LAB
ANION GAP SERPL CALCULATED.3IONS-SCNC: 5 MMOL/L (ref 3–14)
BUN SERPL-MCNC: 10 MG/DL (ref 7–30)
CALCIUM SERPL-MCNC: 9 MG/DL (ref 8.5–10.1)
CHLORIDE SERPL-SCNC: 109 MMOL/L (ref 94–109)
CO2 SERPL-SCNC: 25 MMOL/L (ref 20–32)
CREAT SERPL-MCNC: 0.61 MG/DL (ref 0.52–1.04)
GFR SERPL CREATININE-BSD FRML MDRD: >90 ML/MIN/{1.73_M2}
GLUCOSE SERPL-MCNC: 116 MG/DL (ref 70–99)
POTASSIUM SERPL-SCNC: 3.8 MMOL/L (ref 3.4–5.3)
SODIUM SERPL-SCNC: 139 MMOL/L (ref 133–144)
T4 FREE SERPL-MCNC: 1.08 NG/DL (ref 0.76–1.46)
TSH SERPL DL<=0.005 MIU/L-ACNC: 0.92 MU/L (ref 0.4–4)

## 2020-03-05 ENCOUNTER — HOSPITAL ENCOUNTER (OUTPATIENT)
Facility: CLINIC | Age: 35
Discharge: HOME | End: 2020-03-05
Attending: COLON & RECTAL SURGERY | Admitting: COLON & RECTAL SURGERY
Payer: COMMERCIAL

## 2020-03-05 VITALS
RESPIRATION RATE: 16 BRPM | BODY MASS INDEX: 46.07 KG/M2 | HEART RATE: 74 BPM | HEIGHT: 63 IN | DIASTOLIC BLOOD PRESSURE: 72 MMHG | OXYGEN SATURATION: 97 % | SYSTOLIC BLOOD PRESSURE: 109 MMHG | WEIGHT: 260 LBS

## 2020-03-05 LAB — COLONOSCOPY: NORMAL

## 2020-03-05 PROCEDURE — 88305 TISSUE EXAM BY PATHOLOGIST: CPT | Performed by: COLON & RECTAL SURGERY

## 2020-03-05 PROCEDURE — G0500 MOD SEDAT ENDO SERVICE >5YRS: HCPCS | Performed by: COLON & RECTAL SURGERY

## 2020-03-05 PROCEDURE — 45380 COLONOSCOPY AND BIOPSY: CPT | Performed by: COLON & RECTAL SURGERY

## 2020-03-05 PROCEDURE — 88305 TISSUE EXAM BY PATHOLOGIST: CPT | Mod: 26 | Performed by: COLON & RECTAL SURGERY

## 2020-03-05 PROCEDURE — 25000128 H RX IP 250 OP 636: Performed by: COLON & RECTAL SURGERY

## 2020-03-05 RX ORDER — FLUMAZENIL 0.1 MG/ML
0.2 INJECTION, SOLUTION INTRAVENOUS
Status: DISCONTINUED | OUTPATIENT
Start: 2020-03-05 | End: 2020-03-05 | Stop reason: HOSPADM

## 2020-03-05 RX ORDER — DIPHENHYDRAMINE HYDROCHLORIDE 50 MG/ML
25 INJECTION INTRAMUSCULAR; INTRAVENOUS EVERY 4 HOURS PRN
Status: DISCONTINUED | OUTPATIENT
Start: 2020-03-05 | End: 2020-03-05 | Stop reason: HOSPADM

## 2020-03-05 RX ORDER — ONDANSETRON 2 MG/ML
4 INJECTION INTRAMUSCULAR; INTRAVENOUS
Status: DISCONTINUED | OUTPATIENT
Start: 2020-03-05 | End: 2020-03-05 | Stop reason: HOSPADM

## 2020-03-05 RX ORDER — ONDANSETRON 2 MG/ML
4 INJECTION INTRAMUSCULAR; INTRAVENOUS EVERY 6 HOURS PRN
Status: DISCONTINUED | OUTPATIENT
Start: 2020-03-05 | End: 2020-03-05 | Stop reason: HOSPADM

## 2020-03-05 RX ORDER — LIDOCAINE 40 MG/G
CREAM TOPICAL
Status: DISCONTINUED | OUTPATIENT
Start: 2020-03-05 | End: 2020-03-05 | Stop reason: HOSPADM

## 2020-03-05 RX ORDER — FENTANYL CITRATE 50 UG/ML
INJECTION, SOLUTION INTRAMUSCULAR; INTRAVENOUS PRN
Status: DISCONTINUED | OUTPATIENT
Start: 2020-03-05 | End: 2020-03-05 | Stop reason: HOSPADM

## 2020-03-05 RX ORDER — DIPHENHYDRAMINE HCL 25 MG
25 CAPSULE ORAL EVERY 4 HOURS PRN
Status: DISCONTINUED | OUTPATIENT
Start: 2020-03-05 | End: 2020-03-05 | Stop reason: HOSPADM

## 2020-03-05 RX ORDER — NALOXONE HYDROCHLORIDE 0.4 MG/ML
.1-.4 INJECTION, SOLUTION INTRAMUSCULAR; INTRAVENOUS; SUBCUTANEOUS
Status: DISCONTINUED | OUTPATIENT
Start: 2020-03-05 | End: 2020-03-05 | Stop reason: HOSPADM

## 2020-03-05 RX ORDER — ONDANSETRON 4 MG/1
4 TABLET, ORALLY DISINTEGRATING ORAL EVERY 6 HOURS PRN
Status: DISCONTINUED | OUTPATIENT
Start: 2020-03-05 | End: 2020-03-05 | Stop reason: HOSPADM

## 2020-03-05 ASSESSMENT — MIFFLIN-ST. JEOR: SCORE: 1838.48

## 2020-03-05 NOTE — OP NOTE
See Provation Note In Chart    Eva Branham MD  [facility]  Office Phone # [phone]

## 2020-03-05 NOTE — H&P
Pre-Endoscopy History and Physical     Derrick Mix MRN# [id number]   YOB: 1985 Age: 35 year old     Date of Procedure: 03/05/20  Primary care provider: No Ref-Primary, Physician  Type of Endoscopy: Colonoscopy  Reason for Procedure: diverticulitis   Type of Anesthesia Anticipated: Moderate Sedation    HPI:    Derrick is a 35 year old female who will be undergoing the above procedure.      A history and physical has been performed. The patient's medications and allergies have been reviewed. The risks and benefits of the procedure and the sedation options and risks were discussed with the patient.  All questions were answered and informed consent was obtained.      She denies a personal or family history of anesthesia complications or bleeding disorders.     Allergies   Allergen Reactions     Latex      Swells up      Paxil [Paroxetine Mesylate]      Hives      Zoloft      Hives          No current facility-administered medications on file prior to encounter.   No current outpatient medications on file prior to encounter.      Patient Active Problem List   Diagnosis     Diverticulitis large intestine     Pelvic mass     Morbid obesity (H)     Intermittent asthma     Personal history of tobacco use, presenting hazards to health     Bipolar I disorder (H)        Past Medical History:   Diagnosis Date     Bipolar 1 disorder (H)      Hirsutism      Morbid obesity (H)      Tobacco use      Uncomplicated asthma         Past Surgical History:   Procedure Laterality Date     COLONOSCOPY  03/05/2020    Dr. Yonas JUAREZ     HERNIA REPAIR      Left inguinal repair at 7 YO       Social History     Tobacco Use     Smoking status: Current Some Day Smoker     Packs/day: 1.00     Types: Cigarettes     Smokeless tobacco: Never Used   Substance Use Topics     Alcohol use: Yes     Comment: 1-2 per 2 week       Family History   Problem Relation Age of Onset     Heart Disease Mother      No Known Problems Father       "Cancer No family hx of      Diabetes No family hx of      Colon Cancer No family hx of        REVIEW OF SYSTEMS:     5 point ROS negative except as noted above in HPI, including Gen., Resp., CV, GI &  system review.      PHYSICAL EXAM:   /81   Pulse 81   Resp 22   Ht 1.6 m (5' 3\")   Wt 117.9 kg (260 lb)   SpO2 94%   BMI 46.06 kg/m   Estimated body mass index is 46.06 kg/m  as calculated from the following:    Height as of this encounter: 1.6 m (5' 3\").    Weight as of this encounter: 117.9 kg (260 lb).   GENERAL APPEARANCE: healthy and alert  MENTAL STATUS: alert  AIRWAY EXAM: Mallampatti Class II (visualization of the soft palate, fauces, and uvula)  RESP: lungs clear to auscultation - no rales, rhonchi or wheezes  CV: regular rates and rhythm      IMPRESSION   ASA Class 2 - Mild systemic disease        PLAN:     Plan for colonoscopy. We discussed the risks, benefits and alternatives and the patient wished to proceed.    The above has been forwarded to the consulting provider.      Eva Branham MD  [facility]  Phone: [phone]  Fax: [phone]  March 5, 2020    "

## 2020-03-06 LAB — COPATH REPORT: NORMAL

## 2020-03-11 ENCOUNTER — TELEPHONE (OUTPATIENT)
Dept: INTERNAL MEDICINE | Facility: CLINIC | Age: 35
End: 2020-03-11

## 2020-03-11 NOTE — TELEPHONE ENCOUNTER
Panel Management Review      Patient has the following on her problem list:   Asthma review   No flowsheet data found.   1. Is Asthma diagnosis on the Problem List? Yes    2. Is Asthma listed on Health Maintenance? Yes    3. Patient is due for:  ACT and AAP      Composite cancer screening  Chart review shows that this patient is due/due soon for the following Pap Smear  Summary:    Patient is due/failing the following:   AAP, ACT and PAP    Action needed:   Patient needs office visit for see above.    Type of outreach:    Sent letter.    Questions for provider review:    None                                                                                                                                    .BOBO GOMEZ LPN       Chart routed to none .

## 2020-03-11 NOTE — LETTER
[facility]  [address], [address]  [place], [place]  [address]                                            TEL:[phone]  FAX:[phone]      Derrick Mix  [address] [place] [address]  [facility] [address]      March 12, 2020    Dear Derrick,    At [facility], we care about your health and well-being. A review of your chart has indicated that you are due for a physical,pap smear and asthma check. Please contact us at [phone] to schedule an appointment.     If you have already had one or all of the above screening tests at another facility, please call us to update your chart.        Sincerely,      LISA Lunsford

## 2021-02-05 ENCOUNTER — HOSPITAL ENCOUNTER (EMERGENCY)
Facility: CLINIC | Age: 36
Discharge: HOME | End: 2021-02-05
Attending: EMERGENCY MEDICINE | Admitting: EMERGENCY MEDICINE
Payer: COMMERCIAL

## 2021-02-05 ENCOUNTER — APPOINTMENT (OUTPATIENT)
Dept: CT IMAGING | Facility: CLINIC | Age: 36
End: 2021-02-05
Attending: EMERGENCY MEDICINE
Payer: COMMERCIAL

## 2021-02-05 VITALS
WEIGHT: 270.06 LBS | BODY MASS INDEX: 49.7 KG/M2 | OXYGEN SATURATION: 96 % | HEART RATE: 90 BPM | DIASTOLIC BLOOD PRESSURE: 79 MMHG | TEMPERATURE: 97.4 F | HEIGHT: 62 IN | SYSTOLIC BLOOD PRESSURE: 121 MMHG | RESPIRATION RATE: 22 BRPM

## 2021-02-05 DIAGNOSIS — N12 PYELONEPHRITIS: ICD-10-CM

## 2021-02-05 LAB
ALBUMIN SERPL-MCNC: 3.4 G/DL (ref 3.4–5)
ALBUMIN UR-MCNC: 10 MG/DL
ALP SERPL-CCNC: 84 U/L (ref 40–150)
ALT SERPL W P-5'-P-CCNC: 46 U/L (ref 0–50)
ANION GAP SERPL CALCULATED.3IONS-SCNC: 5 MMOL/L (ref 3–14)
APPEARANCE UR: ABNORMAL
AST SERPL W P-5'-P-CCNC: 28 U/L (ref 0–45)
B-HCG FREE SERPL-ACNC: <5 IU/L
BACTERIA #/AREA URNS HPF: ABNORMAL /HPF
BASOPHILS # BLD AUTO: 0.1 10E9/L (ref 0–0.2)
BASOPHILS NFR BLD AUTO: 0.5 %
BILIRUB SERPL-MCNC: 0.3 MG/DL (ref 0.2–1.3)
BILIRUB UR QL STRIP: NEGATIVE
BUN SERPL-MCNC: 10 MG/DL (ref 7–30)
CALCIUM SERPL-MCNC: 9.3 MG/DL (ref 8.5–10.1)
CHLORIDE SERPL-SCNC: 100 MMOL/L (ref 94–109)
CO2 SERPL-SCNC: 31 MMOL/L (ref 20–32)
COLOR UR AUTO: ABNORMAL
CREAT SERPL-MCNC: 0.95 MG/DL (ref 0.52–1.04)
DIFFERENTIAL METHOD BLD: NORMAL
EOSINOPHIL # BLD AUTO: 0.3 10E9/L (ref 0–0.7)
EOSINOPHIL NFR BLD AUTO: 3.5 %
ERYTHROCYTE [DISTWIDTH] IN BLOOD BY AUTOMATED COUNT: 12.5 % (ref 10–15)
GFR SERPL CREATININE-BSD FRML MDRD: 77 ML/MIN/{1.73_M2}
GLUCOSE SERPL-MCNC: 112 MG/DL (ref 70–99)
GLUCOSE UR STRIP-MCNC: NEGATIVE MG/DL
HCT VFR BLD AUTO: 44.8 % (ref 35–47)
HGB BLD-MCNC: 14.4 G/DL (ref 11.7–15.7)
HGB UR QL STRIP: NEGATIVE
IMM GRANULOCYTES # BLD: 0 10E9/L (ref 0–0.4)
IMM GRANULOCYTES NFR BLD: 0.2 %
KETONES UR STRIP-MCNC: NEGATIVE MG/DL
LACTATE BLD-SCNC: 1.1 MMOL/L (ref 0.7–2)
LEUKOCYTE ESTERASE UR QL STRIP: ABNORMAL
LIPASE SERPL-CCNC: 105 U/L (ref 73–393)
LYMPHOCYTES # BLD AUTO: 2.7 10E9/L (ref 0.8–5.3)
LYMPHOCYTES NFR BLD AUTO: 28.7 %
MCH RBC QN AUTO: 29.8 PG (ref 26.5–33)
MCHC RBC AUTO-ENTMCNC: 32.1 G/DL (ref 31.5–36.5)
MCV RBC AUTO: 93 FL (ref 78–100)
MONOCYTES # BLD AUTO: 0.6 10E9/L (ref 0–1.3)
MONOCYTES NFR BLD AUTO: 6.9 %
MUCOUS THREADS #/AREA URNS LPF: PRESENT /LPF
NEUTROPHILS # BLD AUTO: 5.6 10E9/L (ref 1.6–8.3)
NEUTROPHILS NFR BLD AUTO: 60.2 %
NITRATE UR QL: POSITIVE
NRBC # BLD AUTO: 0 10*3/UL
NRBC BLD AUTO-RTO: 0 /100
PH UR STRIP: 6 PH (ref 5–7)
PLATELET # BLD AUTO: 254 10E9/L (ref 150–450)
POTASSIUM SERPL-SCNC: 3.5 MMOL/L (ref 3.4–5.3)
PROT SERPL-MCNC: 8.2 G/DL (ref 6.8–8.8)
RBC # BLD AUTO: 4.84 10E12/L (ref 3.8–5.2)
RBC #/AREA URNS AUTO: 6 /HPF (ref 0–2)
SODIUM SERPL-SCNC: 136 MMOL/L (ref 133–144)
SOURCE: ABNORMAL
SP GR UR STRIP: 1.03 (ref 1–1.03)
SQUAMOUS #/AREA URNS AUTO: 3 /HPF (ref 0–1)
UROBILINOGEN UR STRIP-MCNC: NORMAL MG/DL (ref 0–2)
WBC # BLD AUTO: 9.3 10E9/L (ref 4–11)
WBC #/AREA URNS AUTO: 80 /HPF (ref 0–5)

## 2021-02-05 PROCEDURE — 87088 URINE BACTERIA CULTURE: CPT | Performed by: EMERGENCY MEDICINE

## 2021-02-05 PROCEDURE — 96375 TX/PRO/DX INJ NEW DRUG ADDON: CPT

## 2021-02-05 PROCEDURE — 250N000011 HC RX IP 250 OP 636: Performed by: EMERGENCY MEDICINE

## 2021-02-05 PROCEDURE — 85025 COMPLETE CBC W/AUTO DIFF WBC: CPT | Performed by: EMERGENCY MEDICINE

## 2021-02-05 PROCEDURE — 96361 HYDRATE IV INFUSION ADD-ON: CPT

## 2021-02-05 PROCEDURE — 250N000009 HC RX 250: Performed by: EMERGENCY MEDICINE

## 2021-02-05 PROCEDURE — 84702 CHORIONIC GONADOTROPIN TEST: CPT

## 2021-02-05 PROCEDURE — 80053 COMPREHEN METABOLIC PANEL: CPT | Performed by: EMERGENCY MEDICINE

## 2021-02-05 PROCEDURE — 96376 TX/PRO/DX INJ SAME DRUG ADON: CPT

## 2021-02-05 PROCEDURE — 99285 EMERGENCY DEPT VISIT HI MDM: CPT | Mod: 25

## 2021-02-05 PROCEDURE — 83690 ASSAY OF LIPASE: CPT | Performed by: EMERGENCY MEDICINE

## 2021-02-05 PROCEDURE — 74177 CT ABD & PELVIS W/CONTRAST: CPT

## 2021-02-05 PROCEDURE — 96365 THER/PROPH/DIAG IV INF INIT: CPT

## 2021-02-05 PROCEDURE — 258N000003 HC RX IP 258 OP 636: Performed by: EMERGENCY MEDICINE

## 2021-02-05 PROCEDURE — 81001 URINALYSIS AUTO W/SCOPE: CPT | Performed by: EMERGENCY MEDICINE

## 2021-02-05 PROCEDURE — 87086 URINE CULTURE/COLONY COUNT: CPT | Performed by: EMERGENCY MEDICINE

## 2021-02-05 PROCEDURE — 83605 ASSAY OF LACTIC ACID: CPT | Performed by: EMERGENCY MEDICINE

## 2021-02-05 PROCEDURE — 87186 SC STD MICRODIL/AGAR DIL: CPT | Performed by: EMERGENCY MEDICINE

## 2021-02-05 RX ORDER — HYDROCODONE BITARTRATE AND ACETAMINOPHEN 5; 325 MG/1; MG/1
1-2 TABLET ORAL EVERY 4 HOURS PRN
Qty: 8 TABLET | Refills: 0 | Status: SHIPPED | OUTPATIENT
Start: 2021-02-05

## 2021-02-05 RX ORDER — ONDANSETRON 4 MG/1
4 TABLET, ORALLY DISINTEGRATING ORAL EVERY 8 HOURS PRN
Qty: 10 TABLET | Refills: 0 | Status: SHIPPED | OUTPATIENT
Start: 2021-02-05 | End: 2021-02-08

## 2021-02-05 RX ORDER — MORPHINE SULFATE 4 MG/ML
4 INJECTION, SOLUTION INTRAMUSCULAR; INTRAVENOUS
Status: DISCONTINUED | OUTPATIENT
Start: 2021-02-05 | End: 2021-02-05 | Stop reason: HOSPADM

## 2021-02-05 RX ORDER — ONDANSETRON 2 MG/ML
4 INJECTION INTRAMUSCULAR; INTRAVENOUS
Status: DISCONTINUED | OUTPATIENT
Start: 2021-02-05 | End: 2021-02-05 | Stop reason: HOSPADM

## 2021-02-05 RX ORDER — IOPAMIDOL 755 MG/ML
500 INJECTION, SOLUTION INTRAVASCULAR ONCE
Status: COMPLETED | OUTPATIENT
Start: 2021-02-05 | End: 2021-02-05

## 2021-02-05 RX ORDER — CEPHALEXIN 500 MG/1
500 CAPSULE ORAL 4 TIMES DAILY
Qty: 28 CAPSULE | Refills: 0 | Status: SHIPPED | OUTPATIENT
Start: 2021-02-05 | End: 2021-02-12

## 2021-02-05 RX ORDER — CEFTRIAXONE 2 G/1
2 INJECTION, POWDER, FOR SOLUTION INTRAMUSCULAR; INTRAVENOUS ONCE
Status: COMPLETED | OUTPATIENT
Start: 2021-02-05 | End: 2021-02-05

## 2021-02-05 RX ADMIN — MORPHINE SULFATE 4 MG: 4 INJECTION INTRAVENOUS at 16:48

## 2021-02-05 RX ADMIN — IOPAMIDOL 100 ML: 755 INJECTION, SOLUTION INTRAVENOUS at 17:27

## 2021-02-05 RX ADMIN — CEFTRIAXONE 2 G: 2 INJECTION, POWDER, FOR SOLUTION INTRAMUSCULAR; INTRAVENOUS at 19:11

## 2021-02-05 RX ADMIN — ONDANSETRON 4 MG: 2 INJECTION INTRAMUSCULAR; INTRAVENOUS at 18:04

## 2021-02-05 RX ADMIN — MORPHINE SULFATE 4 MG: 4 INJECTION INTRAVENOUS at 18:04

## 2021-02-05 RX ADMIN — ONDANSETRON 4 MG: 2 INJECTION INTRAMUSCULAR; INTRAVENOUS at 19:58

## 2021-02-05 RX ADMIN — ONDANSETRON 4 MG: 2 INJECTION INTRAMUSCULAR; INTRAVENOUS at 16:48

## 2021-02-05 RX ADMIN — SODIUM CHLORIDE 65 ML: 9 INJECTION, SOLUTION INTRAVENOUS at 17:27

## 2021-02-05 RX ADMIN — SODIUM CHLORIDE 1000 ML: 9 INJECTION, SOLUTION INTRAVENOUS at 16:48

## 2021-02-05 ASSESSMENT — ENCOUNTER SYMPTOMS
BACK PAIN: 1
ABDOMINAL PAIN: 1
FEVER: 1
VOMITING: 1
NAUSEA: 1
CHILLS: 1

## 2021-02-05 ASSESSMENT — MIFFLIN-ST. JEOR: SCORE: 1863.25

## 2021-02-05 NOTE — ED PROVIDER NOTES
"  History   Chief Complaint:  Abdominal pain     The history is provided by the patient.      Derrick Mix is a 36 year old female with history of diverticulitis of large colon, who presents with abdominal pain. The patient notes that on Saturday she notes that she had subway and had nausea after eating. She reports that she had started to clean after but had onset of chills and a fever of 103.1. She states that she has pain when she eats and drinks certain foods. She had multiple bouts of emesis and that her lat episode was this morning. She notes that she has some left sided abdominal pain that started around 4-5 days ago. She states that she was COVID tested just to be sure and was negative and has not felt better since. She reports some back pain. She denies right sided abdominal pain.     Review of Systems   Constitutional: Positive for chills and fever.   Gastrointestinal: Positive for abdominal pain, nausea and vomiting.   Musculoskeletal: Positive for back pain.   All other systems reviewed and are negative.    Allergies:  Latex  Paxil [Paroxetine Mesylate]  Zoloft    Medications:  Zofran     Past Medical History:    Bipolar 1 disorder   Hirsutism   Obesity  Asthma   Pelvic mass  Diverticulitis large intestine      Past Surgical History:    Colonoscopy x2  Hernia repair     Family History:    Heart disease, mother     Social History:  Smoking status: current some day smoker  Alcohol use: yes  Drug use: yes, marijuana   Presents to the ED alone     Physical Exam     Patient Vitals for the past 24 hrs:   BP Temp Pulse Resp SpO2 Height Weight   02/05/21 1800 -- -- -- -- 96 % -- --   02/05/21 1700 137/89 -- -- -- 96 % -- --   02/05/21 1549 (!) 135/95 97.4  F (36.3  C) 101 22 100 % 1.575 m (5' 2\") 122.5 kg (270 lb 1 oz)       Physical Exam  Constitutional: Alert, attentive  HENT:    Nose: Nose normal.    Mouth/Throat: Oropharynx is clear, mucous membranes are moist  Eyes:  EOM are normal.    CV:  regular rate " and rhythm; no murmurs, rubs or gallups  Chest: Effort normal and breath sounds normal.   GI:   Epigastrium - no tenderness, no guarding   RUQ - no tenderness or Lares's sign   RLQ - No tenderness, no guarding, no Rovsing's sign   Suprapubic area - no tenderness, no guarding    LLQ - mild tenderness, no guarding   LUQ - no tenderness, no guarding   No distension. Normal bowel sounds  MSK: Normal range of motion.   Neurological: Alert, attentive  Skin: Skin is warm and dry.        Emergency Department Course     Imaging:  CT Abdomen/Pelvis w contrast:   1.  Interval resolution of the extensive inflammatory process within the central pelvis.   2.  No evidence for diverticulitis.   3.  IUD in the uterus. Small amount of air in the lower uterine segment and cervix may be related to recent intercourse.   4.  Fatty liver.    Reading per radiology    Laboratory:  CBC: WBC 9.3, HGB 14.4,    CMP: Glucose 112(H), o/w WNL (Creatinine: 0.93)    ISTAT HCG Quantitative Pregnancy (POCT): <5.0   UA: Protein Albumin: 10(A), Nitrite: Positive, Leukocyte Esterase: large (A), WBC: 80 (H), RBC: 6 (H), Bacteria: few(A), Squamous Epithelial: 3 (H), Mucous: Present, o/w Negative  Urine culture: pending   Lactic acid (1626): 1.1  Lipase: 105    Emergency Department Course:  Reviewed:  I reviewed the patient's nursing notes, vitals, past medical records, Care Everywhere.     Assessments:  1555 I performed an assessment and examination of the patient as noted above.     1800 I checked on the patient and updated her on her findings.     2006 Findings and plan explained to the Patient. Patient discharged home with instructions regarding supportive care, medications, and reasons to return. The importance of close follow-up was reviewed.     Interventions:  1648 NS 1L IV Bolus   1648 Zofran 4mg IV injection    1648 Morphine, 4 mg, IV  1804 Morphine, 4 mg, IV  1804 Zofran 4mg IV injection    1911 Rocephin, 2g, IV infusion  1958 Zofran 4mg  IV injection      Disposition:  The patient was discharged to home.       Impression & Plan   Medical Decision Making:  Derrick Mix is a 36 year old female with history of diverticulitis who presents for evaluation of left abdominal pain, occasional vomiting, and reported fevers.  Differential includes possible diverticulitis, colitis, pyonephritis, among others.  CT shows fortunately no evidence of intra-abdominal process.  UA is consistent with infection, with the overall presentation being consistent with pyelonephritis.  Labs are reassuring.  She is not pregnant.  She is tolerating p.o.'s no difficulty here and tachycardia is resolved.  Plan ceftriaxone and transition to Keflex with supportive measures at home.  Primary care follow-up in 2 to 3 days and return precautions for inability to tolerate Keflex, worse pain, fever, or any other concerns.      Covid-19  Derrick Mix was evaluated during a global COVID-19 pandemic, which necessitated consideration that the patient might be at risk for infection with the SARS-CoV-2 virus that causes COVID-19.   Applicable protocols for evaluation were followed during the patient's care.   COVID-19 was considered as part of the patient's evaluation. The plan for testing is:  a test was obtained at a previous visit and reviewed & considered today.       Diagnosis:    ICD-10-CM    1. Pyelonephritis  N12        Discharge Medications:  New Prescriptions    CEPHALEXIN (KEFLEX) 500 MG CAPSULE    Take 1 capsule (500 mg) by mouth 4 times daily for 7 days    HYDROCODONE-ACETAMINOPHEN (NORCO) 5-325 MG TABLET    Take 1-2 tablets by mouth every 4 hours as needed for pain    ONDANSETRON (ZOFRAN ODT) 4 MG ODT TAB    Take 1 tablet (4 mg) by mouth every 8 hours as needed for nausea       Scribe Disclosure:  Patricia CHONG, am serving as a scribe at 3:56 PM on 2/5/2021 to document services personally performed by Quang Fatima MD based on my observations and the  provider's statements to me.             Quang Fatima MD  02/05/21 2114

## 2021-02-05 NOTE — ED TRIAGE NOTES
Left sided abdominal pain since Saturday. Fevers (103.1), bloating, diarrhea, vomiting, and decreased appetite. Hx diverticulitis.

## 2021-02-07 LAB
BACTERIA SPEC CULT: ABNORMAL
Lab: ABNORMAL
SPECIMEN SOURCE: ABNORMAL

## 2021-09-13 ENCOUNTER — HOSPITAL ENCOUNTER (EMERGENCY)
Facility: CLINIC | Age: 36
Discharge: HOME | End: 2021-09-13
Payer: COMMERCIAL

## 2021-09-13 VITALS
OXYGEN SATURATION: 100 % | HEART RATE: 103 BPM | RESPIRATION RATE: 20 BRPM | DIASTOLIC BLOOD PRESSURE: 85 MMHG | SYSTOLIC BLOOD PRESSURE: 156 MMHG | TEMPERATURE: 98.4 F

## 2021-09-13 NOTE — ED TRIAGE NOTES
Patient presents to the ED reporting abdominal pain and back pain. Pain is worse with eating. Also nauseated. History of diverticulitis and PID.

## (undated) DEVICE — KIT ENDO TURNOVER/PROCEDURE W/CLEAN A SCOPE LINERS 103888

## (undated) DEVICE — ENDO FORCEP ENDOJAW BIOPSY 2.8MMX230CM FB-220U

## (undated) RX ORDER — FENTANYL CITRATE 50 UG/ML
INJECTION, SOLUTION INTRAMUSCULAR; INTRAVENOUS
Status: DISPENSED
Start: 2020-03-05

## (undated) RX ORDER — SIMETHICONE 40MG/0.6ML
SUSPENSION, DROPS(FINAL DOSAGE FORM)(ML) ORAL
Status: DISPENSED
Start: 2020-03-05